# Patient Record
Sex: MALE | Race: BLACK OR AFRICAN AMERICAN | NOT HISPANIC OR LATINO
[De-identification: names, ages, dates, MRNs, and addresses within clinical notes are randomized per-mention and may not be internally consistent; named-entity substitution may affect disease eponyms.]

---

## 2020-04-23 PROBLEM — Z00.00 ENCOUNTER FOR PREVENTIVE HEALTH EXAMINATION: Status: ACTIVE | Noted: 2020-04-23

## 2020-04-27 ENCOUNTER — APPOINTMENT (OUTPATIENT)
Dept: COLORECTAL SURGERY | Facility: CLINIC | Age: 65
End: 2020-04-27
Payer: MEDICARE

## 2020-04-27 VITALS
TEMPERATURE: 98.6 F | HEART RATE: 76 BPM | DIASTOLIC BLOOD PRESSURE: 80 MMHG | WEIGHT: 202 LBS | BODY MASS INDEX: 28.92 KG/M2 | HEIGHT: 70 IN | SYSTOLIC BLOOD PRESSURE: 144 MMHG

## 2020-04-27 PROCEDURE — 45330 DIAGNOSTIC SIGMOIDOSCOPY: CPT

## 2020-04-27 PROCEDURE — 99205 OFFICE O/P NEW HI 60 MIN: CPT | Mod: 25

## 2020-04-27 RX ORDER — CAPECITABINE 500 MG/1
500 TABLET, FILM COATED ORAL
Refills: 0 | Status: ACTIVE | COMMUNITY

## 2020-04-27 NOTE — PHYSICAL EXAM
[Abdomen Masses] : No abdominal masses [No HSM] : no hepatosplenomegaly [Normal] : was normal [FreeTextEntry1] : The risks , benefits and alternatives of the procedure were reviewed with the patient. The patient consents to the planned procedure.\par \par The flexible sigmoidoscope was passed through the anus into the rectum. The scope was passed to   90 cm from the anal verge.\par \par The findings revealed:\par At 25 cm  cm semicicumfernetial ulcerated mass with polypoid/friable  edges\par

## 2020-04-27 NOTE — HISTORY OF PRESENT ILLNESS
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 Longoria and ONC Dr. Ho\par \par hx of colonoscopy 1/22/20 w/ moderately differentiated adenocarcinoma fragments on biopsy.\par MMR intact\par Previous colonoscopy in 2014, normal as per patient\par \par Completed CRT with Dr. Ho and Dr. Longoria, currently on Xeloda, however advised to stop as per patient\par hx of 6 weeks of radiation, completed approx 1 month ago.\par \par PET/CT 4/17/20:\par Impression:\par The previously seen hypermetabolic sigmoid lesion appears altered in position from the right lower quadrant to the anterior mid pelvis. No longer demonstrates hypermetabolism\par \par Focal hypermetabolism of a small bowel loop superior to the abnormal sigmoid segment is a finding of indeterminate significance\par \par Appetite ok, energy levels somewhat low but walks daily\par Last episode of rectal bleeding 1 month ago\par Denies abdominal pain or rectal pain\par \par BH: twice daily, no complaints\par Denies hx of aspirin allergy, admits to hx of stomach ulcer and upper GI bleed in 1986\par \par hx of 9/11 [employer] exposure\par Denies [facility] colorectal CA

## 2020-04-27 NOTE — ASSESSMENT
[FreeTextEntry1] : I had extensive discussion with the patient (45 minutes) regarding the diagnosis and treatment options. I recommended that he consider proceeding with a robotic rectosigmoid resection with loop[ ileostomy creation.\par \par The associated risks, benefits, alternatives of the procedure have been outlined discussed and reviewed with the patient's family. These risks including but not limited to bleeding, infection, anastomotic leak, need for secondary surgery, need for ileostomy or colostomy creation, change in bowel habits, change in urinary function, nerve injury, change in sexual function, as well as the risk of heart and lung complications infection and death were detailed. The patient understands these risks and consents the planned procedure. Appropriate  literature regarding surgery and post operative treatment/complications and enhanced recovery pathway has been detailed and reviewed. Consent was obtained. All questions were answered.\par \par Case will be scheduled pending clearance from [facility] surgical hold-\par

## 2020-04-30 ENCOUNTER — OUTPATIENT (OUTPATIENT)
Dept: OUTPATIENT SERVICES | Facility: HOSPITAL | Age: 65
LOS: 1 days | End: 2020-04-30
Payer: MEDICARE

## 2020-04-30 ENCOUNTER — RESULT REVIEW (OUTPATIENT)
Age: 65
End: 2020-04-30

## 2020-04-30 DIAGNOSIS — C18.7 MALIGNANT NEOPLASM OF SIGMOID COLON: ICD-10-CM

## 2020-04-30 PROCEDURE — 88321 CONSLTJ&REPRT SLD PREP ELSWR: CPT

## 2020-05-01 LAB — SURGICAL PATHOLOGY STUDY: SIGNIFICANT CHANGE UP

## 2020-05-21 ENCOUNTER — NON-APPOINTMENT (OUTPATIENT)
Age: 65
End: 2020-05-21

## 2020-05-21 ENCOUNTER — APPOINTMENT (OUTPATIENT)
Dept: HEART AND VASCULAR | Facility: CLINIC | Age: 65
End: 2020-05-21
Payer: MEDICARE

## 2020-05-21 VITALS
TEMPERATURE: 98.3 F | WEIGHT: 192.99 LBS | SYSTOLIC BLOOD PRESSURE: 150 MMHG | HEIGHT: 70 IN | DIASTOLIC BLOOD PRESSURE: 80 MMHG | BODY MASS INDEX: 27.63 KG/M2 | OXYGEN SATURATION: 98 % | HEART RATE: 80 BPM

## 2020-05-21 DIAGNOSIS — C61 MALIGNANT NEOPLASM OF PROSTATE: ICD-10-CM

## 2020-05-21 DIAGNOSIS — Z01.810 ENCOUNTER FOR PREPROCEDURAL CARDIOVASCULAR EXAMINATION: ICD-10-CM

## 2020-05-21 DIAGNOSIS — Z85.46 PERSONAL HISTORY OF MALIGNANT NEOPLASM OF PROSTATE: ICD-10-CM

## 2020-05-21 PROCEDURE — 99204 OFFICE O/P NEW MOD 45 MIN: CPT

## 2020-05-21 PROCEDURE — 93000 ELECTROCARDIOGRAM COMPLETE: CPT

## 2020-05-21 NOTE — REASON FOR VISIT
[FreeTextEntry1] : 66 y/o AAM with colon CA\par \par \par No DM, HLD, never smoked.  2 BP readings are mildly elevated.

## 2020-05-21 NOTE — PHYSICAL EXAM
[General Appearance - Well Developed] : well developed [Well Groomed] : well groomed [Normal Appearance] : normal appearance [General Appearance - Well Nourished] : well nourished [General Appearance - In No Acute Distress] : no acute distress [No Deformities] : no deformities [Normal Conjunctiva] : the conjunctiva exhibited no abnormalities [Heart Sounds] : normal S1 and S2 [Heart Rate And Rhythm] : heart rate and rhythm were normal [Exaggerated Use Of Accessory Muscles For Inspiration] : no accessory muscle use [Respiration, Rhythm And Depth] : normal respiratory rhythm and effort [Abdomen Soft] : soft [Abdomen Tenderness] : non-tender [Auscultation Breath Sounds / Voice Sounds] : lungs were clear to auscultation bilaterally [Abnormal Walk] : normal gait [Abdomen Mass (___ Cm)] : no abdominal mass palpated [Gait - Sufficient For Exercise Testing] : the gait was sufficient for exercise testing [Nail Clubbing] : no clubbing of the fingernails [Cyanosis, Localized] : no localized cyanosis [Petechial Hemorrhages (___cm)] : no petechial hemorrhages [Skin Color & Pigmentation] : normal skin color and pigmentation [] : no rash [No Venous Stasis] : no venous stasis [Skin Lesions] : no skin lesions [No Skin Ulcers] : no skin ulcer [No Xanthoma] : no  xanthoma was observed [Eyelids - No Xanthelasma] : the eyelids demonstrated no xanthelasmas [Oriented To Time, Place, And Person] : oriented to person, place, and time [Mood] : the mood was normal [Affect] : the affect was normal [No Anxiety] : not feeling anxious [FreeTextEntry1] : 1/6 GARY

## 2020-05-21 NOTE — REVIEW OF SYSTEMS
[Change In The Stool] : change in stool [Urinary Frequency] : urinary frequency [Tingling (Paresthesia)] : tingling [Negative] : Heme/Lymph

## 2020-05-21 NOTE — ASSESSMENT
[FreeTextEntry1] : PreOp- Pt is low risk from a CV standpoint.  He is cleared to proceed.  Labs done by Dr Ho.\par \par Murmur- Will echo but [facility] [place] [facility] so we will defer this for now.\par \par Mild HTN- most likely he is hypertensive.  Meds deferred prior to surgery, not needed at this time.

## 2020-05-28 DIAGNOSIS — Z01.818 ENCOUNTER FOR OTHER PREPROCEDURAL EXAMINATION: ICD-10-CM

## 2020-06-01 ENCOUNTER — TRANSCRIPTION ENCOUNTER (OUTPATIENT)
Age: 65
End: 2020-06-01

## 2020-06-01 ENCOUNTER — LABORATORY RESULT (OUTPATIENT)
Age: 65
End: 2020-06-01

## 2020-06-01 VITALS
SYSTOLIC BLOOD PRESSURE: 137 MMHG | HEART RATE: 75 BPM | HEIGHT: 69 IN | WEIGHT: 190.04 LBS | DIASTOLIC BLOOD PRESSURE: 78 MMHG | TEMPERATURE: 99 F | RESPIRATION RATE: 16 BRPM | OXYGEN SATURATION: 97 %

## 2020-06-01 LAB
APTT BLD: 32.6 SEC
BASOPHILS # BLD AUTO: 0.02 K/UL
BASOPHILS NFR BLD AUTO: 0.4 %
EOSINOPHIL # BLD AUTO: 0.06 K/UL
EOSINOPHIL NFR BLD AUTO: 1.3 %
HCT VFR BLD CALC: 44.2 %
HGB BLD-MCNC: 14.5 G/DL
IMM GRANULOCYTES NFR BLD AUTO: 0.2 %
INR PPP: 0.98 RATIO
LYMPHOCYTES # BLD AUTO: 1.22 K/UL
LYMPHOCYTES NFR BLD AUTO: 25.4 %
MAN DIFF?: NORMAL
MCHC RBC-ENTMCNC: 32.8 GM/DL
MCHC RBC-ENTMCNC: 33.8 PG
MCV RBC AUTO: 103 FL
MONOCYTES # BLD AUTO: 0.46 K/UL
MONOCYTES NFR BLD AUTO: 9.6 %
NEUTROPHILS # BLD AUTO: 3.03 K/UL
NEUTROPHILS NFR BLD AUTO: 63.1 %
PLATELET # BLD AUTO: 249 K/UL
PT BLD: 11.2 SEC
RBC # BLD: 4.29 M/UL
RBC # FLD: 14.3 %
WBC # FLD AUTO: 4.8 K/UL

## 2020-06-02 ENCOUNTER — RESULT REVIEW (OUTPATIENT)
Age: 65
End: 2020-06-02

## 2020-06-02 ENCOUNTER — INPATIENT (INPATIENT)
Facility: HOSPITAL | Age: 65
LOS: 1 days | Discharge: HOME HEALTH | DRG: 331 | End: 2020-06-04
Attending: SURGERY | Admitting: SURGERY
Payer: MEDICARE

## 2020-06-02 ENCOUNTER — APPOINTMENT (OUTPATIENT)
Dept: COLORECTAL SURGERY | Facility: HOSPITAL | Age: 65
End: 2020-06-02

## 2020-06-02 LAB
ANION GAP SERPL CALC-SCNC: 10 MMOL/L — SIGNIFICANT CHANGE UP (ref 5–17)
BUN SERPL-MCNC: 10 MG/DL — SIGNIFICANT CHANGE UP (ref 7–23)
CALCIUM SERPL-MCNC: 8.3 MG/DL — LOW (ref 8.4–10.5)
CHLORIDE SERPL-SCNC: 107 MMOL/L — SIGNIFICANT CHANGE UP (ref 96–108)
CO2 SERPL-SCNC: 21 MMOL/L — LOW (ref 22–31)
CREAT SERPL-MCNC: 0.94 MG/DL — SIGNIFICANT CHANGE UP (ref 0.5–1.3)
GLUCOSE BLDC GLUCOMTR-MCNC: 104 MG/DL — HIGH (ref 70–99)
GLUCOSE SERPL-MCNC: 211 MG/DL — HIGH (ref 70–99)
HCT VFR BLD CALC: 38.4 % — LOW (ref 39–50)
HGB BLD-MCNC: 12.8 G/DL — LOW (ref 13–17)
MAGNESIUM SERPL-MCNC: 2 MG/DL — SIGNIFICANT CHANGE UP (ref 1.6–2.6)
MCHC RBC-ENTMCNC: 33.3 GM/DL — SIGNIFICANT CHANGE UP (ref 32–36)
MCHC RBC-ENTMCNC: 34.2 PG — HIGH (ref 27–34)
MCV RBC AUTO: 102.7 FL — HIGH (ref 80–100)
NRBC # BLD: 0 /100 WBCS — SIGNIFICANT CHANGE UP (ref 0–0)
PHOSPHATE SERPL-MCNC: 3.4 MG/DL — SIGNIFICANT CHANGE UP (ref 2.5–4.5)
PLATELET # BLD AUTO: 220 K/UL — SIGNIFICANT CHANGE UP (ref 150–400)
POTASSIUM SERPL-MCNC: 3.6 MMOL/L — SIGNIFICANT CHANGE UP (ref 3.5–5.3)
POTASSIUM SERPL-SCNC: 3.6 MMOL/L — SIGNIFICANT CHANGE UP (ref 3.5–5.3)
RBC # BLD: 3.74 M/UL — LOW (ref 4.2–5.8)
RBC # FLD: 13.9 % — SIGNIFICANT CHANGE UP (ref 10.3–14.5)
SODIUM SERPL-SCNC: 138 MMOL/L — SIGNIFICANT CHANGE UP (ref 135–145)
WBC # BLD: 8.97 K/UL — SIGNIFICANT CHANGE UP (ref 3.8–10.5)
WBC # FLD AUTO: 8.97 K/UL — SIGNIFICANT CHANGE UP (ref 3.8–10.5)

## 2020-06-02 PROCEDURE — 44207 L COLECTOMY/COLOPROCTOSTOMY: CPT | Mod: GC

## 2020-06-02 PROCEDURE — 45330 DIAGNOSTIC SIGMOIDOSCOPY: CPT | Mod: GC

## 2020-06-02 PROCEDURE — 88304 TISSUE EXAM BY PATHOLOGIST: CPT | Mod: 26

## 2020-06-02 PROCEDURE — 88309 TISSUE EXAM BY PATHOLOGIST: CPT | Mod: 26

## 2020-06-02 PROCEDURE — 44213 LAP MOBIL SPLENIC FL ADD-ON: CPT | Mod: GC

## 2020-06-02 RX ORDER — BUPIVACAINE 13.3 MG/ML
20 INJECTION, SUSPENSION, LIPOSOMAL INFILTRATION ONCE
Refills: 0 | Status: DISCONTINUED | OUTPATIENT
Start: 2020-06-02 | End: 2020-06-04

## 2020-06-02 RX ORDER — SODIUM CHLORIDE 9 MG/ML
1000 INJECTION, SOLUTION INTRAVENOUS
Refills: 0 | Status: DISCONTINUED | OUTPATIENT
Start: 2020-06-02 | End: 2020-06-03

## 2020-06-02 RX ORDER — TAMSULOSIN HYDROCHLORIDE 0.4 MG/1
0.4 CAPSULE ORAL AT BEDTIME
Refills: 0 | Status: DISCONTINUED | OUTPATIENT
Start: 2020-06-02 | End: 2020-06-04

## 2020-06-02 RX ORDER — ENOXAPARIN SODIUM 100 MG/ML
40 INJECTION SUBCUTANEOUS DAILY
Refills: 0 | Status: DISCONTINUED | OUTPATIENT
Start: 2020-06-03 | End: 2020-06-04

## 2020-06-02 RX ORDER — KETOROLAC TROMETHAMINE 30 MG/ML
15 SYRINGE (ML) INJECTION EVERY 6 HOURS
Refills: 0 | Status: DISCONTINUED | OUTPATIENT
Start: 2020-06-02 | End: 2020-06-04

## 2020-06-02 RX ORDER — HEPARIN SODIUM 5000 [USP'U]/ML
5000 INJECTION INTRAVENOUS; SUBCUTANEOUS ONCE
Refills: 0 | Status: COMPLETED | OUTPATIENT
Start: 2020-06-02 | End: 2020-06-02

## 2020-06-02 RX ORDER — HYDROMORPHONE HYDROCHLORIDE 2 MG/ML
0.5 INJECTION INTRAMUSCULAR; INTRAVENOUS; SUBCUTANEOUS EVERY 4 HOURS
Refills: 0 | Status: DISCONTINUED | OUTPATIENT
Start: 2020-06-02 | End: 2020-06-04

## 2020-06-02 RX ORDER — ACETAMINOPHEN 500 MG
1000 TABLET ORAL EVERY 6 HOURS
Refills: 0 | Status: DISCONTINUED | OUTPATIENT
Start: 2020-06-02 | End: 2020-06-04

## 2020-06-02 RX ORDER — POTASSIUM CHLORIDE 20 MEQ
40 PACKET (EA) ORAL ONCE
Refills: 0 | Status: COMPLETED | OUTPATIENT
Start: 2020-06-02 | End: 2020-06-02

## 2020-06-02 RX ORDER — GABAPENTIN 400 MG/1
600 CAPSULE ORAL ONCE
Refills: 0 | Status: COMPLETED | OUTPATIENT
Start: 2020-06-02 | End: 2020-06-02

## 2020-06-02 RX ORDER — ONDANSETRON 8 MG/1
4 TABLET, FILM COATED ORAL EVERY 4 HOURS
Refills: 0 | Status: DISCONTINUED | OUTPATIENT
Start: 2020-06-02 | End: 2020-06-04

## 2020-06-02 RX ORDER — ACETAMINOPHEN 500 MG
1000 TABLET ORAL ONCE
Refills: 0 | Status: COMPLETED | OUTPATIENT
Start: 2020-06-02 | End: 2020-06-02

## 2020-06-02 RX ADMIN — HEPARIN SODIUM 5000 UNIT(S): 5000 INJECTION INTRAVENOUS; SUBCUTANEOUS at 06:38

## 2020-06-02 RX ADMIN — Medication 15 MILLIGRAM(S): at 23:01

## 2020-06-02 RX ADMIN — Medication 40 MILLIEQUIVALENT(S): at 17:32

## 2020-06-02 RX ADMIN — Medication 1000 MILLIGRAM(S): at 23:01

## 2020-06-02 RX ADMIN — Medication 1000 MILLIGRAM(S): at 06:38

## 2020-06-02 RX ADMIN — GABAPENTIN 600 MILLIGRAM(S): 400 CAPSULE ORAL at 06:38

## 2020-06-02 RX ADMIN — TAMSULOSIN HYDROCHLORIDE 0.4 MILLIGRAM(S): 0.4 CAPSULE ORAL at 22:07

## 2020-06-02 RX ADMIN — Medication 1000 MILLIGRAM(S): at 17:43

## 2020-06-02 RX ADMIN — Medication 15 MILLIGRAM(S): at 17:32

## 2020-06-02 NOTE — BRIEF OPERATIVE NOTE - OPERATION/FINDINGS
Diagnostic laparoscopy revealing sigmoid lesion with associated colonoscopic tattoo. Robotic assisted laparoscopic isolation of the DEVI and IMV. High ligation of each vessel using robotic vessel sealer. Medial-to-lateral mesenteric dissection and partial splenic flexure mobilization. Distal margin divided using Blue 60mm Robotic stapler x 2 at convergence of tenia. Proximal margin interrogated using ICG and transected at viable distal descending colon. Subsequent creation of end-to-end colorectal anastomosis using transanal 28mm circular EEA stapler. 2 intact donuts sent for pathology. Anastomosis evaluated using flexible sigmoidoscopy showing patent and hemostatic anastomosis with a negative air leak test. Terminal ileum brought up for diverting loop ileostomy given hx of external beam radiation and brachitherapy for prostate CA. Hemostasis achieved and colorectal bundle closing tray utilized. Peritoneum closed with vicryl. Fascia closed with #[id number]. Skin closed primarily. Ileostomy matured in the Right abdomen.

## 2020-06-02 NOTE — H&P ADULT - NSICDXPASTMEDICALHX_GEN_ALL_CORE_FT
PAST MEDICAL HISTORY:  Benign prostate hyperplasia     Cancer of sigmoid     Prostate CA s/p brachytherapy

## 2020-06-02 NOTE — H&P ADULT - NSHPPHYSICALEXAM_GEN_ALL_CORE
Constitutional: WDWN NAD  Heart: S1 S2  Lungs: no use of accessory muscles, clear to auscultation bilaterally  Abdomen: soft, nontender, nondistended, without masses, ecchymosis, erythema

## 2020-06-02 NOTE — H&P ADULT - HISTORY OF PRESENT ILLNESS
65 year old male with PMH of prostate ca about 4 years ago s/p brachytherapy, BPH, rectosigmoid cancer s/p CRT presents for elective sigmoidectomy. Patient underwent colonoscopy 1/22/20 which found moderately differentiated adenocarcinoma fragments on biopsy. Completed CRT, hx of 6 weeks of radiation that was completed about 1 month prior, and was on Xeloda however recently stopped as per his oncologist. Currently, patient has no complaints.

## 2020-06-02 NOTE — PROGRESS NOTE ADULT - SUBJECTIVE AND OBJECTIVE BOX
POST-OPERATIVE NOTE    Procedure:    Diagnosis/Indication:    Surgeon:    S: Pt seen and examined at bedside in PACU. Not endorsing any significant abdominal or incisional pain; patient received some medications prior to encounter. No nausea or emesis. Has not had any clear liquids yet. Denies chest pain or shortness of breath. No discomfort with mac    O:  T(C): 36 (06-02-20 @ 13:04), Max: 36 (06-02-20 @ 13:04)  T(F): 96.8 (06-02-20 @ 13:04), Max: 96.8 (06-02-20 @ 13:04)  HR: 88 (06-02-20 @ 15:30) (81 - 88)  BP: 135/73 (06-02-20 @ 15:30) (130/65 - 151/78)  RR: 10 (06-02-20 @ 15:30) (7 - 12)  SpO2: 100% (06-02-20 @ 15:30) (100% - 100%)  Wt(kg): --                        12.8   8.97  )-----------( 220      ( 02 Jun 2020 13:15 )             38.4     06-02    138  |  107  |  10  ----------------------------<  211<H>  3.6   |  21<L>  |  0.94    Ca    8.3<L>      02 Jun 2020 13:15  Phos  3.4     06-02  Mg     2.0     06-02        Gen: NAD, AAOx2  C/V: Normal Sinus to marginally tachycardic  Pulm: Nonlabored breathing, no respiratory distress; nonrebreather in place  Abd: soft, NT, ND, incisions C/D/I, stoma pink ostomy appliance placed over; no significant output  Extrem: WWP, no calf edema or tenderness, SCDs in place

## 2020-06-02 NOTE — PROGRESS NOTE ADULT - ASSESSMENT
65M now s/p sigmoid colectomy for CRC POD 0    ERAS  CLD  IVF  Ostomy Care  [place]  Pain/Nausea control  Lovenox  OOBA/IS/SCD

## 2020-06-02 NOTE — H&P ADULT - ASSESSMENT
65 year old male with PMH of prostate ca about 4 years ago s/p brachytherapy, BPH, rectosigmoid cancer s/p CRT presents for elective sigmoidectomy.     Plan:  to OR  admission post op  ERAS protocol  Colon bundle

## 2020-06-03 LAB
A1C WITH ESTIMATED AVERAGE GLUCOSE RESULT: 5.4 % — SIGNIFICANT CHANGE UP (ref 4–5.6)
ANION GAP SERPL CALC-SCNC: 10 MMOL/L — SIGNIFICANT CHANGE UP (ref 5–17)
BUN SERPL-MCNC: 11 MG/DL — SIGNIFICANT CHANGE UP (ref 7–23)
CALCIUM SERPL-MCNC: 9.1 MG/DL — SIGNIFICANT CHANGE UP (ref 8.4–10.5)
CHLORIDE SERPL-SCNC: 109 MMOL/L — HIGH (ref 96–108)
CO2 SERPL-SCNC: 22 MMOL/L — SIGNIFICANT CHANGE UP (ref 22–31)
CREAT SERPL-MCNC: 1.07 MG/DL — SIGNIFICANT CHANGE UP (ref 0.5–1.3)
ESTIMATED AVERAGE GLUCOSE: 108 MG/DL — SIGNIFICANT CHANGE UP (ref 68–114)
GLUCOSE SERPL-MCNC: 162 MG/DL — HIGH (ref 70–99)
HCT VFR BLD CALC: 38.2 % — LOW (ref 39–50)
HCV AB S/CO SERPL IA: 0.08 S/CO — SIGNIFICANT CHANGE UP
HCV AB SERPL-IMP: SIGNIFICANT CHANGE UP
HGB BLD-MCNC: 12.7 G/DL — LOW (ref 13–17)
MAGNESIUM SERPL-MCNC: 2.2 MG/DL — SIGNIFICANT CHANGE UP (ref 1.6–2.6)
MCHC RBC-ENTMCNC: 33.2 GM/DL — SIGNIFICANT CHANGE UP (ref 32–36)
MCHC RBC-ENTMCNC: 34 PG — SIGNIFICANT CHANGE UP (ref 27–34)
MCV RBC AUTO: 102.4 FL — HIGH (ref 80–100)
NRBC # BLD: 0 /100 WBCS — SIGNIFICANT CHANGE UP (ref 0–0)
PHOSPHATE SERPL-MCNC: 3.2 MG/DL — SIGNIFICANT CHANGE UP (ref 2.5–4.5)
PLATELET # BLD AUTO: 211 K/UL — SIGNIFICANT CHANGE UP (ref 150–400)
POTASSIUM SERPL-MCNC: 4.4 MMOL/L — SIGNIFICANT CHANGE UP (ref 3.5–5.3)
POTASSIUM SERPL-SCNC: 4.4 MMOL/L — SIGNIFICANT CHANGE UP (ref 3.5–5.3)
RBC # BLD: 3.73 M/UL — LOW (ref 4.2–5.8)
RBC # FLD: 13.8 % — SIGNIFICANT CHANGE UP (ref 10.3–14.5)
SODIUM SERPL-SCNC: 141 MMOL/L — SIGNIFICANT CHANGE UP (ref 135–145)
WBC # BLD: 7.87 K/UL — SIGNIFICANT CHANGE UP (ref 3.8–10.5)
WBC # FLD AUTO: 7.87 K/UL — SIGNIFICANT CHANGE UP (ref 3.8–10.5)

## 2020-06-03 RX ADMIN — Medication 1000 MILLIGRAM(S): at 17:12

## 2020-06-03 RX ADMIN — TAMSULOSIN HYDROCHLORIDE 0.4 MILLIGRAM(S): 0.4 CAPSULE ORAL at 21:26

## 2020-06-03 RX ADMIN — Medication 15 MILLIGRAM(S): at 11:37

## 2020-06-03 RX ADMIN — Medication 1000 MILLIGRAM(S): at 11:39

## 2020-06-03 RX ADMIN — Medication 1000 MILLIGRAM(S): at 05:20

## 2020-06-03 RX ADMIN — Medication 15 MILLIGRAM(S): at 23:24

## 2020-06-03 RX ADMIN — Medication 15 MILLIGRAM(S): at 17:13

## 2020-06-03 RX ADMIN — Medication 1000 MILLIGRAM(S): at 23:20

## 2020-06-03 RX ADMIN — Medication 15 MILLIGRAM(S): at 05:20

## 2020-06-03 RX ADMIN — SODIUM CHLORIDE 40 MILLILITER(S): 9 INJECTION, SOLUTION INTRAVENOUS at 16:12

## 2020-06-03 RX ADMIN — HYDROMORPHONE HYDROCHLORIDE 0.5 MILLIGRAM(S): 2 INJECTION INTRAMUSCULAR; INTRAVENOUS; SUBCUTANEOUS at 16:17

## 2020-06-03 RX ADMIN — ENOXAPARIN SODIUM 40 MILLIGRAM(S): 100 INJECTION SUBCUTANEOUS at 11:45

## 2020-06-03 NOTE — ADVANCED PRACTICE NURSE CONSULT - REASON FOR CONSULT
[facility] nurse consult to instruct patient and spouse on ostomy care. The patient is a 65 year old male with PMH of prostate ca about 4 years ago s/p brachytherapy, BPH, rectosigmoid cancer s/p CRT presents for elective sigmoidectomy. Patient underwent colonoscopy 1/22/20 which found moderately differentiated adenocarcinoma fragments on biopsy. Completed CRT, hx of 6 weeks of radiation that was completed about 1 month prior, and was on Xeloda however recently stopped as per his oncologist. Patient is s/p sigmoid colectomy and loop ileostomy 6/2/20.

## 2020-06-03 NOTE — ADVANCED PRACTICE NURSE CONSULT - ASSESSMENT
Loop ileostomy stoma pink, budded, measuring 1 1/4 inches, functioning with thick brown effluent. Peristomal skin intact. Patient and spouse receptive to ostomy teaching. Patient's spouse participated in changing pouching system while the patient observed. Applied Cavilon skin prep to peristomal skin, measured stoma, cut skin barrier to accommodate stoma, applied stoma ring around stoma, then [place] 2 piece flat 1 3/4 inch, cut to fit, lock and roll pouching system. Patient demonstrated ability to close ostomy pouch. Provided patient with ostomy supplies and Ileostomy teaching guide.

## 2020-06-03 NOTE — PROGRESS NOTE ADULT - SUBJECTIVE AND OBJECTIVE BOX
SUBJECTIVE: Patient seen and examined at bedside with chief. Patient reports doing well, pain is controlled, and states he is hungry, and is passing gas into the ostomy bag.   Patient denies fever, chills, chest pain, nausea, vomiting or shortness of breathe.       MEDICATIONS  (STANDING):  acetaminophen   Tablet .. 1000 milliGRAM(s) Oral every 6 hours  BUpivacaine liposome 1.3% Injectable (no eMAR) 20 milliLiter(s) Local Injection once  enoxaparin Injectable 40 milliGRAM(s) SubCutaneous daily  ketorolac   Injectable 15 milliGRAM(s) IV Push every 6 hours  lactated ringers. 1000 milliLiter(s) (40 mL/Hr) IV Continuous <Continuous>  tamsulosin 0.4 milliGRAM(s) Oral at bedtime    MEDICATIONS  (PRN):  HYDROmorphone  Injectable 0.5 milliGRAM(s) IV Push every 4 hours PRN Severe Pain (7 - 10)  ondansetron Injectable 4 milliGRAM(s) IV Push every 4 hours PRN Nausea and/or Vomiting      Vital Signs Last 24 Hrs  T(C): 36.7 (03 Jun 2020 05:31), Max: 37.3 (03 Jun 2020 00:15)  T(F): 98 (03 Jun 2020 05:31), Max: 99.1 (03 Jun 2020 00:15)  HR: 95 (03 Jun 2020 05:31) (81 - 104)  BP: 118/63 (03 Jun 2020 05:31) (104/64 - 174/92)  BP(mean): 124 (02 Jun 2020 18:00) (92 - 124)  RR: 20 (03 Jun 2020 05:31) (7 - 22)  SpO2: 98% (03 Jun 2020 05:31) (98% - 100%)    Physical Exam:  GENERAL: NAD, Resting comfortably in bed  RESP: Nonlabored breathing, No respiratory distress  CARD: Normal rate, Normal peripheral perfusion  GI: Soft, NT, ND, no guarding, no rebound tenderness, stoma is patent and viable with stool and gas in the bag, incisions are clean, dry and intact  EXTREM: WWP, No edema, SCDs in place    I&O's Summary    02 Jun 2020 07:01  -  03 Jun 2020 07:00  --------------------------------------------------------  IN: 980 mL / OUT: 2780 mL / NET: -1800 mL        LABS:                        12.7   7.87  )-----------( 211      ( 03 Jun 2020 06:33 )             38.2     06-02    138  |  107  |  10  ----------------------------<  211<H>  3.6   |  21<L>  |  0.94    Ca    8.3<L>      02 Jun 2020 13:15  Phos  3.4     06-02  Mg     2.0     06-02          CAPILLARY BLOOD GLUCOSE

## 2020-06-04 ENCOUNTER — TRANSCRIPTION ENCOUNTER (OUTPATIENT)
Age: 65
End: 2020-06-04

## 2020-06-04 VITALS
RESPIRATION RATE: 17 BRPM | OXYGEN SATURATION: 99 % | HEART RATE: 80 BPM | TEMPERATURE: 98 F | SYSTOLIC BLOOD PRESSURE: 154 MMHG | DIASTOLIC BLOOD PRESSURE: 81 MMHG

## 2020-06-04 LAB
ANION GAP SERPL CALC-SCNC: 7 MMOL/L — SIGNIFICANT CHANGE UP (ref 5–17)
BUN SERPL-MCNC: 14 MG/DL — SIGNIFICANT CHANGE UP (ref 7–23)
CALCIUM SERPL-MCNC: 8.6 MG/DL — SIGNIFICANT CHANGE UP (ref 8.4–10.5)
CHLORIDE SERPL-SCNC: 109 MMOL/L — HIGH (ref 96–108)
CO2 SERPL-SCNC: 26 MMOL/L — SIGNIFICANT CHANGE UP (ref 22–31)
CREAT SERPL-MCNC: 1.13 MG/DL — SIGNIFICANT CHANGE UP (ref 0.5–1.3)
GLUCOSE SERPL-MCNC: 96 MG/DL — SIGNIFICANT CHANGE UP (ref 70–99)
HCT VFR BLD CALC: 36.9 % — LOW (ref 39–50)
HGB BLD-MCNC: 12.1 G/DL — LOW (ref 13–17)
MAGNESIUM SERPL-MCNC: 2 MG/DL — SIGNIFICANT CHANGE UP (ref 1.6–2.6)
MCHC RBC-ENTMCNC: 32.8 GM/DL — SIGNIFICANT CHANGE UP (ref 32–36)
MCHC RBC-ENTMCNC: 33.8 PG — SIGNIFICANT CHANGE UP (ref 27–34)
MCV RBC AUTO: 103.1 FL — HIGH (ref 80–100)
NRBC # BLD: 0 /100 WBCS — SIGNIFICANT CHANGE UP (ref 0–0)
PHOSPHATE SERPL-MCNC: 1.9 MG/DL — LOW (ref 2.5–4.5)
PLATELET # BLD AUTO: 193 K/UL — SIGNIFICANT CHANGE UP (ref 150–400)
POTASSIUM SERPL-MCNC: 3.9 MMOL/L — SIGNIFICANT CHANGE UP (ref 3.5–5.3)
POTASSIUM SERPL-SCNC: 3.9 MMOL/L — SIGNIFICANT CHANGE UP (ref 3.5–5.3)
RBC # BLD: 3.58 M/UL — LOW (ref 4.2–5.8)
RBC # FLD: 13.7 % — SIGNIFICANT CHANGE UP (ref 10.3–14.5)
SODIUM SERPL-SCNC: 142 MMOL/L — SIGNIFICANT CHANGE UP (ref 135–145)
WBC # BLD: 6.21 K/UL — SIGNIFICANT CHANGE UP (ref 3.8–10.5)
WBC # FLD AUTO: 6.21 K/UL — SIGNIFICANT CHANGE UP (ref 3.8–10.5)

## 2020-06-04 PROCEDURE — 85027 COMPLETE CBC AUTOMATED: CPT

## 2020-06-04 PROCEDURE — 36415 COLL VENOUS BLD VENIPUNCTURE: CPT

## 2020-06-04 PROCEDURE — 83735 ASSAY OF MAGNESIUM: CPT

## 2020-06-04 PROCEDURE — 83036 HEMOGLOBIN GLYCOSYLATED A1C: CPT

## 2020-06-04 PROCEDURE — 82962 GLUCOSE BLOOD TEST: CPT

## 2020-06-04 PROCEDURE — 80048 BASIC METABOLIC PNL TOTAL CA: CPT

## 2020-06-04 PROCEDURE — 88304 TISSUE EXAM BY PATHOLOGIST: CPT

## 2020-06-04 PROCEDURE — S2900: CPT

## 2020-06-04 PROCEDURE — 84100 ASSAY OF PHOSPHORUS: CPT

## 2020-06-04 PROCEDURE — 86850 RBC ANTIBODY SCREEN: CPT

## 2020-06-04 PROCEDURE — 86803 HEPATITIS C AB TEST: CPT

## 2020-06-04 PROCEDURE — 86901 BLOOD TYPING SEROLOGIC RH(D): CPT

## 2020-06-04 PROCEDURE — C1889: CPT

## 2020-06-04 PROCEDURE — 88309 TISSUE EXAM BY PATHOLOGIST: CPT

## 2020-06-04 RX ORDER — ACETAMINOPHEN 500 MG
2 TABLET ORAL
Qty: 0 | Refills: 0 | DISCHARGE
Start: 2020-06-04

## 2020-06-04 RX ORDER — DIPHENOXYLATE HCL/ATROPINE 2.5-.025MG
1 TABLET ORAL
Qty: 14 | Refills: 0
Start: 2020-06-04 | End: 2020-06-10

## 2020-06-04 RX ORDER — SODIUM,POTASSIUM PHOSPHATES 278-250MG
1 POWDER IN PACKET (EA) ORAL ONCE
Refills: 0 | Status: COMPLETED | OUTPATIENT
Start: 2020-06-04 | End: 2020-06-04

## 2020-06-04 RX ORDER — TAMSULOSIN HYDROCHLORIDE 0.4 MG/1
1 CAPSULE ORAL
Qty: 0 | Refills: 0 | DISCHARGE
Start: 2020-06-04

## 2020-06-04 RX ADMIN — Medication 1000 MILLIGRAM(S): at 05:12

## 2020-06-04 RX ADMIN — Medication 15 MILLIGRAM(S): at 11:30

## 2020-06-04 RX ADMIN — Medication 15 MILLIGRAM(S): at 05:12

## 2020-06-04 RX ADMIN — HYDROMORPHONE HYDROCHLORIDE 0.5 MILLIGRAM(S): 2 INJECTION INTRAMUSCULAR; INTRAVENOUS; SUBCUTANEOUS at 08:21

## 2020-06-04 RX ADMIN — ENOXAPARIN SODIUM 40 MILLIGRAM(S): 100 INJECTION SUBCUTANEOUS at 11:30

## 2020-06-04 RX ADMIN — Medication 1000 MILLIGRAM(S): at 11:29

## 2020-06-04 RX ADMIN — Medication 1 PACKET(S): at 10:28

## 2020-06-04 NOTE — DISCHARGE NOTE PROVIDER - NSDCFUADDINST_GEN_ALL_CORE_FT
Please continue a LOW-FIBER DIET. Listed below are some foods you may eat and those you should avoid.   --Allowed foods:  White bread without nuts and seeds  White rice, plain white pasta, and crackers  Refined hot cereals, such as Cream of Wheat, or cold cereals with less than 1 gram of fiber per serving  Pancakes or waffles made from white refined flour  Most canned or well-cooked vegetables and fruits without skins or seeds  Fruit and vegetable juice with little or no pulp, fruit-flavored drinks, and flavored duque  Tender meat, poultry, fish, eggs and tofu  Milk and foods made from milk — such as yogurt, pudding, ice cream, cheeses and sour cream — if tolerated  Butter, margarine, oils and salad dressings without seeds  --Foods to avoid:  Whole-wheat or whole-grain breads, cereals and pasta  Brown or wild rice and other whole grains, such as oats, kasha, barley and quinoa  Dried fruits and prune juice  Raw fruit, including those with seeds, skin or membranes, such as berries  Raw or undercooked vegetables, including corn  Dried beans, peas and lentils  Seeds and nuts and foods containing them, including peanut butter and other nut butters  Coconut  Popcorn  General Discharge Instructions:  Please resume all regular home medications unless specifically advised not to take a particular medication. Also, please take any new medications as prescribed.  Please get plenty of rest, continue to ambulate several times per day, and drink adequate amounts of fluids. Avoid lifting weights greater than 5-10 lbs until you follow-up with your surgeon, who will instruct you further regarding activity restrictions.  Avoid driving or operating heavy machinery while taking pain medications.  Please follow-up with your surgeon and Primary Care Provider (PCP) as advised.  Incision Care:  *Please call your doctor or nurse practitioner if you have increased pain, swelling, redness, or drainage from the incision site.  *Avoid swimming and baths until your follow-up appointment.  *You may shower, and wash surgical incisions with a mild soap and warm water. Gently pat the area dry.  *If you have staples, they will be removed at your follow-up appointment.  *If you have steri-strips, they will fall off on their own. Please remove any remaining strips 7-10 days after surgery.  Warning Signs:  Please call your doctor or nurse practitioner if you experience the following:  *You experience new chest pain, pressure, squeezing or tightness.  *New or worsening cough, shortness of breath, or wheeze.  *If you are vomiting and cannot keep down fluids or your medications.  *You are getting dehydrated due to continued vomiting, diarrhea, or other reasons. Signs of dehydration include dry mouth, rapid heartbeat, or feeling dizzy or faint when standing.  *You see blood or dark/black material when you vomit or have a bowel movement.  *You experience burning when you urinate, have blood in your urine, or experience a discharge.  *Your pain is not improving within 8-12 hours or is not gone within 24 hours. Call or return immediately if your pain is getting worse, changes location, or moves to your chest or back.  *You have shaking chills, or fever greater than 101.5 degrees Fahrenheit or 38 degrees Celsius.  *Any change in your symptoms, or any new symptoms that concern you.  You have given a prescription for Lomotil. Please take as needed-1 tab twice daily if emptying 1 liter or more out of ostomy daily. Please call Dr. Rick if ostomy output is 1 liter or more daily.

## 2020-06-04 NOTE — PROGRESS NOTE ADULT - ASSESSMENT
65 year old male with PMH of prostate ca about 4 years ago s/p brachytherapy, BPH, rectosigmoid cancer s/p CRT presents for elective sigmoidectomy. Patient underwent colonoscopy 1/22/20 which found moderately differentiated adenocarcinoma fragments on biopsy. Pt is now s/p robot assisted sigmoid colectomy with diverting loop ileostomy 6/2     - LRD  - Pain/Nausea Control PRN  - Lovenox/SCDs  - OOB/IS  - AM Labs   - dc today with ostomy teaching

## 2020-06-04 NOTE — DISCHARGE NOTE PROVIDER - HOSPITAL COURSE
65 year old male with PMH of prostate CA about 4 years ago s/p brachytherapy, BPH, rectosigmoid cancer s/p CRT presented for elective sigmoidectomy. Patient underwent colonoscopy 1/22/20 which found moderately differentiated adenocarcinoma fragments on biopsy. Completed CRT, hx of 6 weeks of radiation that was completed about 1 month prior, and was on Xeloda however recently stopped as per his oncologist. On day of admission, patient had no complaints. Post operatively the patient was admitted to the surgical service for further monitoring and management. His postoperative course was unremarkable with advancement of diet, passing trial of void, and pain control. On day of discharge patient was stable to be d/c'd home.

## 2020-06-04 NOTE — DISCHARGE NOTE PROVIDER - NSDCMRMEDTOKEN_GEN_ALL_CORE_FT
acetaminophen 500 mg oral tablet: 2 tab(s) orally every 6 hours, As Needed for moderate pain  Lomotil 2.5 mg-0.025 mg oral tablet: 1 tab(s) orally 2 times a day, As Needed -for high ostomy output greater than 1 liter a day MDD:2   oxycodone-acetaminophen 5 mg-325 mg oral tablet: 1 tab(s) orally every 4 hours, As Needed -for severe pain MDD:4   tamsulosin 0.4 mg oral capsule: 1 cap(s) orally once a day (at bedtime)

## 2020-06-04 NOTE — ADVANCED PRACTICE NURSE CONSULT - ASSESSMENT
Loop ileostomy stoma pink, budded, measuring 1 1/4 inches, functioning with thick brown effluent. Peristomal skin intact. Patient  receptive to ostomy teaching. Patient changed pouching system with WOCN coaching. Applied Cavilon skin prep to peristomal skin, measured stoma, cut skin barrier to accommodate stoma, applied stoma ring around stoma, then [place] 2 piece flat 1 3/4 inch, cut to fit, lock and roll pouching system. Provided patient with ostomy supplies, written instructions for changing ostomy pouching system and ileostomy teaching guide. Placed list of required prescriptions for ostomy supplies and provided patient with a copy. Faxed [employer] enrollment form to [employer].

## 2020-06-04 NOTE — PROGRESS NOTE ADULT - SUBJECTIVE AND OBJECTIVE BOX
SUBJECTIVE:  pt seen at bedside, feels alright. eating okay without nausea    MEDICATIONS  (STANDING):  acetaminophen   Tablet .. 1000 milliGRAM(s) Oral every 6 hours  BUpivacaine liposome 1.3% Injectable (no eMAR) 20 milliLiter(s) Local Injection once  enoxaparin Injectable 40 milliGRAM(s) SubCutaneous daily  ketorolac   Injectable 15 milliGRAM(s) IV Push every 6 hours  tamsulosin 0.4 milliGRAM(s) Oral at bedtime    MEDICATIONS  (PRN):  HYDROmorphone  Injectable 0.5 milliGRAM(s) IV Push every 4 hours PRN Severe Pain (7 - 10)  ondansetron Injectable 4 milliGRAM(s) IV Push every 4 hours PRN Nausea and/or Vomiting      Vital Signs Last 24 Hrs  T(C): 36.8 (04 Jun 2020 05:09), Max: 36.9 (03 Jun 2020 20:52)  T(F): 98.2 (04 Jun 2020 05:09), Max: 98.4 (03 Jun 2020 20:52)  HR: 74 (04 Jun 2020 05:09) (74 - 96)  BP: 132/77 (04 Jun 2020 05:09) (132/77 - 160/64)  BP(mean): --  RR: 18 (03 Jun 2020 20:52) (16 - 18)  SpO2: 98% (03 Jun 2020 20:52) (97% - 100%)    Physical Exam:  General: NAD, resting comfortably in bed  Pulmonary: Nonlabored breathing, no respiratory distress  Cardiovascular: NSR  Abdominal: soft, NT/ND, incisions c/d/i, ostomy in place, pink and patent with stool and gas  Extremities: WWP, normal strength  Neuro: A/O x 3, no focal deficits, normal motor/sensation  Pulses: palpable distal pulses    I&O's Summary    03 Jun 2020 07:01  -  04 Jun 2020 07:00  --------------------------------------------------------  IN: 2160 mL / OUT: 3685 mL / NET: -1525 mL        LABS:                        12.1   6.21  )-----------( 193      ( 04 Jun 2020 06:35 )             36.9     06-04    142  |  109<H>  |  14  ----------------------------<  96  3.9   |  26  |  1.13    Ca    8.6      04 Jun 2020 06:35  Phos  1.9     06-04  Mg     2.0     06-04          CAPILLARY BLOOD GLUCOSE            RADIOLOGY & ADDITIONAL STUDIES:

## 2020-06-04 NOTE — DISCHARGE NOTE PROVIDER - CARE PROVIDER_API CALL
Ivan Rick  Colon and Rectal Surgery  [address] [place] [address]  Phone: [phone]  Fax: [phone]  Follow Up Time: 1 week

## 2020-06-04 NOTE — DISCHARGE NOTE NURSING/CASE MANAGEMENT/SOCIAL WORK - PATIENT PORTAL LINK FT
You can access the FollowMyHealth Patient Portal offered by [facility] by registering at the following website: [email or web address]. By joining [facility]’s FollowMyHealth portal, you will also be able to view your health information using other applications (apps) compatible with our system.

## 2020-06-04 NOTE — DISCHARGE NOTE PROVIDER - NSDCCPCAREPLAN_GEN_ALL_CORE_FT
PRINCIPAL DISCHARGE DIAGNOSIS  Diagnosis: Colon cancer  Assessment and Plan of Treatment: s/p sigmoidectomy and ileostomy creation      SECONDARY DISCHARGE DIAGNOSES  Diagnosis: Prostate CA  Assessment and Plan of Treatment: s/p brachytherapy    Diagnosis: Benign prostate hyperplasia  Assessment and Plan of Treatment: on flomax

## 2020-06-04 NOTE — ADVANCED PRACTICE NURSE CONSULT - RECOMMEDATIONS
The patient would benefit from home care services to reinforce ostomy teaching.   MARY ELLEN discussed the same with MARISA Hannon, surgery team and discharge planners.

## 2020-06-08 DIAGNOSIS — K21.9 GASTRO-ESOPHAGEAL REFLUX DISEASE WITHOUT ESOPHAGITIS: ICD-10-CM

## 2020-06-08 DIAGNOSIS — C18.7 MALIGNANT NEOPLASM OF SIGMOID COLON: ICD-10-CM

## 2020-06-08 DIAGNOSIS — Z85.46 PERSONAL HISTORY OF MALIGNANT NEOPLASM OF PROSTATE: ICD-10-CM

## 2020-06-08 LAB — SURGICAL PATHOLOGY STUDY: SIGNIFICANT CHANGE UP

## 2020-06-10 PROBLEM — C18.7 MALIGNANT NEOPLASM OF SIGMOID COLON: Chronic | Status: ACTIVE | Noted: 2020-06-02

## 2020-06-10 PROBLEM — N40.0 BENIGN PROSTATIC HYPERPLASIA WITHOUT LOWER URINARY TRACT SYMPTOMS: Chronic | Status: ACTIVE | Noted: 2020-06-02

## 2020-06-10 PROBLEM — C61 MALIGNANT NEOPLASM OF PROSTATE: Chronic | Status: ACTIVE | Noted: 2020-06-02

## 2020-06-19 ENCOUNTER — APPOINTMENT (OUTPATIENT)
Dept: COLORECTAL SURGERY | Facility: CLINIC | Age: 65
End: 2020-06-19
Payer: MEDICARE

## 2020-06-19 VITALS
BODY MASS INDEX: 25.62 KG/M2 | DIASTOLIC BLOOD PRESSURE: 78 MMHG | HEIGHT: 70 IN | SYSTOLIC BLOOD PRESSURE: 121 MMHG | TEMPERATURE: 98 F | WEIGHT: 179 LBS | HEART RATE: 84 BPM

## 2020-06-19 PROCEDURE — 99024 POSTOP FOLLOW-UP VISIT: CPT

## 2020-06-19 NOTE — ASSESSMENT
[FreeTextEntry1] : Stage 2- colon cancer.  No adverse risk factors.\par \par Advised increasing diet and activity.\par \par Return in 4 weeks for sigmoidoscopy and evaluation of anastomosis in preparation for planned ileostomy closure.\par \par Oncological follow up with Dr. Ho.\par

## 2020-06-19 NOTE — HISTORY OF PRESENT ILLNESS
[FreeTextEntry1] : 66 y/o M presents for f/u T3N0 sigmoid cancer\par S/p rectosigmoid colectomy 6/2/20\par Final Diagnosis\par \par 1. Rectosigmoid colon, resection:\par - Adenocarcinoma, 2.8 cm, architecturally moderately\par differentiated with high\par nuclear grade\par - Tumor invades through the muscularis propria, 5 mm into\par subserosal soft tissue\par - No lymphovascular or perineural invasion identified\par - Proximal, distal and mesenteric margins negative for tumor\par - Seventeen lymph nodes, negative for tumor (0/17)\par - Negative for tumor deposits\par \par 2. Proximal doughnut, excision:\par - Negative for tumor\par \par 3. Distal doughnut, excision:\par - Negative for tumor\par \par Comment:\par Testing for mismatch repair proteins was performed on the biopsy.\par See [facility]\par .\par \par Synoptic Summary\par Specimen: rectosigmoid colon\par Procedure: resection\par Specimen length: 28.5 cm\par Tumor site: (above or below the peritoneal reflection). above\par Tumor size: 2.8 cm\par Macroscopic tumor perforation: absent\par Histologic type: adenocarcinoma\par Histologic grade: moderately differentiated\par Microscopic tumor invasion: through muscularis propria into\par subserosal soft tissue\par Lymphocytic response, intratumoral or peritumoral: absent\par Margins\par Proximal margin: negative for tumor\par Distal margin: negative for tumor\par Circumferential (radial margin) or mesenteric margin:\par negative for tumor\par Deep margin: negative for tumor\par Distance from closest margin: 5.5 cm ( mesenteric margin)\par Distance from anal verge:\par Treatment effect: N/A\par Small lymphovascular invasion: not identified\par Venous (large vessel) invasion: not identified\par Perineural invasion: not identified\par Tumor deposits: absent\par Tumor border configuration: invasive\par Additional pathologic findings:\par Lymph nodes:\par Number examined: 17\par Number involved: 0\par Pathologic stage:  pT3  pN0\par \par Reports occasional incisional pain, using Tylenol PRN. Incisions healing well\par C/o dry mouth that is affecting sleeping, wants to begin using [employer] products\par Reports occasional nausea, denies vomiting. Has tried citrus fruits, apples and canned fruit without issue\par Reports energy slowly returning to normal \par BH: 2-4 times daily. Watery to paste like consistency. Changing appliance twice per week \par Denies irritation or bleeding from stoma \par

## 2020-06-19 NOTE — PHYSICAL EXAM
[Abdomen Masses] : No abdominal masses [No HSM] : no hepatosplenomegaly [Abdomen Tenderness] : ~T No ~M abdominal tenderness [de-identified] : incisions well healed.  ileostomy- functional. thick effluent

## 2020-07-17 ENCOUNTER — APPOINTMENT (OUTPATIENT)
Dept: COLORECTAL SURGERY | Facility: CLINIC | Age: 65
End: 2020-07-17
Payer: MEDICARE

## 2020-07-17 VITALS
TEMPERATURE: 98.2 F | HEIGHT: 70 IN | HEART RATE: 74 BPM | WEIGHT: 188 LBS | DIASTOLIC BLOOD PRESSURE: 81 MMHG | SYSTOLIC BLOOD PRESSURE: 135 MMHG | BODY MASS INDEX: 26.92 KG/M2

## 2020-07-17 PROCEDURE — 45330 DIAGNOSTIC SIGMOIDOSCOPY: CPT | Mod: 58

## 2020-07-17 PROCEDURE — 99214 OFFICE O/P EST MOD 30 MIN: CPT | Mod: 25

## 2020-07-17 PROCEDURE — 99024 POSTOP FOLLOW-UP VISIT: CPT

## 2020-07-17 NOTE — HISTORY OF PRESENT ILLNESS
[FreeTextEntry1] : 66 y/o M presents for f/u T3N0 sigmoid colon cancer\par S/p rectosigmoid colectomy 6/2/20\par Denies abdominal or rectal pain, no longer using Tylenol\par Incisions have healed well\par Reports good energy levels. Has a good appetite but still following a low fiber diet\par BH:3-5 paste like outputs daily\par Changing appliance twice weekly. Denies irritation or bleeding from stoma \par

## 2020-07-17 NOTE — PHYSICAL EXAM
[Abdomen Masses] : No abdominal masses [Abdomen Tenderness] : ~T No ~M abdominal tenderness [No HSM] : no hepatosplenomegaly [Normal] : was normal [None] : there was no rectal mass  [FreeTextEntry1] : The risks , benefits and alternatives of the procedure were reviewed with the patient. The patient consents to the planned procedure.\par \par The flexible sigmoidoscope was passed through the anus into the rectum. The scope was passed to    35  cm from the anal verge.\par \par The findings revealed:\par \par well healed anastomosis at 14cm. widely patent.\par  [de-identified] : incisions well healed.  ileostomy- functional. thick effluent

## 2020-08-24 ENCOUNTER — LABORATORY RESULT (OUTPATIENT)
Age: 65
End: 2020-08-24

## 2020-08-25 ENCOUNTER — NON-APPOINTMENT (OUTPATIENT)
Age: 65
End: 2020-08-25

## 2020-08-25 ENCOUNTER — APPOINTMENT (OUTPATIENT)
Dept: HEART AND VASCULAR | Facility: CLINIC | Age: 65
End: 2020-08-25
Payer: MEDICARE

## 2020-08-25 VITALS
WEIGHT: 187 LBS | HEART RATE: 85 BPM | TEMPERATURE: 94.4 F | DIASTOLIC BLOOD PRESSURE: 78 MMHG | OXYGEN SATURATION: 99 % | BODY MASS INDEX: 26.77 KG/M2 | HEIGHT: 70 IN | SYSTOLIC BLOOD PRESSURE: 130 MMHG

## 2020-08-25 DIAGNOSIS — Z78.9 OTHER SPECIFIED HEALTH STATUS: ICD-10-CM

## 2020-08-25 DIAGNOSIS — I10 ESSENTIAL (PRIMARY) HYPERTENSION: ICD-10-CM

## 2020-08-25 PROCEDURE — 93000 ELECTROCARDIOGRAM COMPLETE: CPT

## 2020-08-25 PROCEDURE — 99213 OFFICE O/P EST LOW 20 MIN: CPT

## 2020-08-25 RX ORDER — MOMETASONE FUROATE 220 UG/1
220 INHALANT RESPIRATORY (INHALATION)
Qty: 1 | Refills: 0 | Status: ACTIVE | COMMUNITY
Start: 2019-11-22

## 2020-08-25 RX ORDER — OXYCODONE AND ACETAMINOPHEN 5; 325 MG/1; MG/1
5-325 TABLET ORAL
Qty: 10 | Refills: 0 | Status: DISCONTINUED | COMMUNITY
Start: 2020-06-04 | End: 2020-08-25

## 2020-08-25 RX ORDER — TAMSULOSIN HYDROCHLORIDE 0.4 MG/1
0.4 CAPSULE ORAL
Qty: 90 | Refills: 3 | Status: ACTIVE | COMMUNITY
Start: 2020-08-25

## 2020-08-25 RX ORDER — LEVALBUTEROL TARTRATE 45 UG/1
45 AEROSOL, METERED ORAL
Qty: 15 | Refills: 0 | Status: ACTIVE | COMMUNITY
Start: 2019-11-22

## 2020-08-25 RX ORDER — DIPHENOXYLATE HYDROCHLORIDE AND ATROPINE SULFATE 2.5; .025 MG/1; MG/1
2.5-0.025 TABLET ORAL
Qty: 180 | Refills: 0 | Status: ACTIVE | COMMUNITY
Start: 2020-07-28

## 2020-08-25 RX ORDER — OMEPRAZOLE 40 MG/1
40 CAPSULE, DELAYED RELEASE ORAL
Qty: 30 | Refills: 0 | Status: DISCONTINUED | COMMUNITY
Start: 2020-03-03 | End: 2020-08-25

## 2020-08-25 RX ORDER — TAMSULOSIN HYDROCHLORIDE 0.4 MG/1
0.4 CAPSULE ORAL
Refills: 0 | Status: DISCONTINUED | COMMUNITY
End: 2020-08-25

## 2020-08-25 NOTE — REVIEW OF SYSTEMS
[Urinary Frequency] : urinary frequency [Change In The Stool] : change in stool [Tingling (Paresthesia)] : tingling [Negative] : Heme/Lymph

## 2020-08-25 NOTE — REASON FOR VISIT
[FreeTextEntry1] : 66 y/o AAM presents for f/u T3N0 sigmoid colon cancer\par S/p rectosigmoid colectomy 6/2/20\par Pt to have surgery 8/27/20 for removal of stoma/colostomy.\par \par No DM, HLD, never smoked.  2 BP readings are mildly elevated.

## 2020-08-25 NOTE — PHYSICAL EXAM
[General Appearance - Well Developed] : well developed [Normal Appearance] : normal appearance [Well Groomed] : well groomed [No Deformities] : no deformities [General Appearance - In No Acute Distress] : no acute distress [General Appearance - Well Nourished] : well nourished [Normal Conjunctiva] : the conjunctiva exhibited no abnormalities [Eyelids - No Xanthelasma] : the eyelids demonstrated no xanthelasmas [Respiration, Rhythm And Depth] : normal respiratory rhythm and effort [Exaggerated Use Of Accessory Muscles For Inspiration] : no accessory muscle use [Auscultation Breath Sounds / Voice Sounds] : lungs were clear to auscultation bilaterally [Heart Rate And Rhythm] : heart rate and rhythm were normal [Heart Sounds] : normal S1 and S2 [Abdomen Soft] : soft [Abdomen Tenderness] : non-tender [Abdomen Mass (___ Cm)] : no abdominal mass palpated [Abnormal Walk] : normal gait [Gait - Sufficient For Exercise Testing] : the gait was sufficient for exercise testing [Cyanosis, Localized] : no localized cyanosis [Nail Clubbing] : no clubbing of the fingernails [Petechial Hemorrhages (___cm)] : no petechial hemorrhages [Skin Color & Pigmentation] : normal skin color and pigmentation [No Venous Stasis] : no venous stasis [] : no rash [No Xanthoma] : no  xanthoma was observed [Skin Lesions] : no skin lesions [No Skin Ulcers] : no skin ulcer [Affect] : the affect was normal [Oriented To Time, Place, And Person] : oriented to person, place, and time [Mood] : the mood was normal [No Anxiety] : not feeling anxious [FreeTextEntry1] : 1/6 GARY

## 2020-08-25 NOTE — ASSESSMENT
[FreeTextEntry1] : PreOp- Pt is low risk from a CV standpoint.  He is cleared to proceed.  Labs done by Dr Ho.  He did well with surgery in June, he is cleared again to proceed.\par \par Murmur- Will echo but he does not want to travel from [place] via subway so we will defer this for now.\par \par Mild HTN- most likely he is hypertensive.  Meds deferred prior to surgery, not needed at this time.

## 2020-08-26 ENCOUNTER — TRANSCRIPTION ENCOUNTER (OUTPATIENT)
Age: 65
End: 2020-08-26

## 2020-08-26 NOTE — PATIENT PROFILE ADULT - PRO INTERPRETER NEED 2
13 y/o male with 12 hours of umbilical and rlq sharp stabbing, constant, 10/10 pain associated with nausea, vomiting, anorexia and chills.  NO priors.  NO loose stools, no blood in stool.  
English

## 2020-08-27 ENCOUNTER — INPATIENT (INPATIENT)
Facility: HOSPITAL | Age: 65
LOS: 1 days | Discharge: HOME | DRG: 331 | End: 2020-08-29
Attending: SURGERY | Admitting: SURGERY
Payer: MEDICARE

## 2020-08-27 ENCOUNTER — RESULT REVIEW (OUTPATIENT)
Age: 65
End: 2020-08-27

## 2020-08-27 ENCOUNTER — APPOINTMENT (OUTPATIENT)
Dept: COLORECTAL SURGERY | Facility: HOSPITAL | Age: 65
End: 2020-08-27

## 2020-08-27 VITALS
SYSTOLIC BLOOD PRESSURE: 153 MMHG | DIASTOLIC BLOOD PRESSURE: 77 MMHG | RESPIRATION RATE: 16 BRPM | HEIGHT: 66 IN | HEART RATE: 86 BPM | TEMPERATURE: 97 F | WEIGHT: 183.87 LBS

## 2020-08-27 DIAGNOSIS — Z41.9 ENCOUNTER FOR PROCEDURE FOR PURPOSES OTHER THAN REMEDYING HEALTH STATE, UNSPECIFIED: Chronic | ICD-10-CM

## 2020-08-27 PROCEDURE — 88304 TISSUE EXAM BY PATHOLOGIST: CPT | Mod: 26

## 2020-08-27 PROCEDURE — 44625 REPAIR BOWEL OPENING: CPT | Mod: 58,GC

## 2020-08-27 RX ORDER — HEPARIN SODIUM 5000 [USP'U]/ML
5000 INJECTION INTRAVENOUS; SUBCUTANEOUS ONCE
Refills: 0 | Status: COMPLETED | OUTPATIENT
Start: 2020-08-27 | End: 2020-08-27

## 2020-08-27 RX ORDER — PANTOPRAZOLE SODIUM 20 MG/1
40 TABLET, DELAYED RELEASE ORAL DAILY
Refills: 0 | Status: DISCONTINUED | OUTPATIENT
Start: 2020-08-27 | End: 2020-08-29

## 2020-08-27 RX ORDER — GABAPENTIN 400 MG/1
600 CAPSULE ORAL ONCE
Refills: 0 | Status: COMPLETED | OUTPATIENT
Start: 2020-08-27 | End: 2020-08-27

## 2020-08-27 RX ORDER — BUPIVACAINE 13.3 MG/ML
20 INJECTION, SUSPENSION, LIPOSOMAL INFILTRATION ONCE
Refills: 0 | Status: DISCONTINUED | OUTPATIENT
Start: 2020-08-27 | End: 2020-08-29

## 2020-08-27 RX ORDER — ENOXAPARIN SODIUM 100 MG/ML
40 INJECTION SUBCUTANEOUS AT BEDTIME
Refills: 0 | Status: DISCONTINUED | OUTPATIENT
Start: 2020-08-27 | End: 2020-08-29

## 2020-08-27 RX ORDER — ONDANSETRON 8 MG/1
4 TABLET, FILM COATED ORAL EVERY 6 HOURS
Refills: 0 | Status: DISCONTINUED | OUTPATIENT
Start: 2020-08-27 | End: 2020-08-29

## 2020-08-27 RX ORDER — SODIUM CHLORIDE 9 MG/ML
1000 INJECTION, SOLUTION INTRAVENOUS
Refills: 0 | Status: DISCONTINUED | OUTPATIENT
Start: 2020-08-27 | End: 2020-08-28

## 2020-08-27 RX ORDER — OXYCODONE HYDROCHLORIDE 5 MG/1
2.5 TABLET ORAL EVERY 4 HOURS
Refills: 0 | Status: DISCONTINUED | OUTPATIENT
Start: 2020-08-27 | End: 2020-08-29

## 2020-08-27 RX ORDER — OXYCODONE HYDROCHLORIDE 5 MG/1
5 TABLET ORAL EVERY 4 HOURS
Refills: 0 | Status: DISCONTINUED | OUTPATIENT
Start: 2020-08-27 | End: 2020-08-29

## 2020-08-27 RX ORDER — ACETAMINOPHEN 500 MG
650 TABLET ORAL EVERY 4 HOURS
Refills: 0 | Status: DISCONTINUED | OUTPATIENT
Start: 2020-08-27 | End: 2020-08-29

## 2020-08-27 RX ORDER — ACETAMINOPHEN 500 MG
1000 TABLET ORAL ONCE
Refills: 0 | Status: COMPLETED | OUTPATIENT
Start: 2020-08-27 | End: 2020-08-27

## 2020-08-27 RX ORDER — HYDROMORPHONE HYDROCHLORIDE 2 MG/ML
0.5 INJECTION INTRAMUSCULAR; INTRAVENOUS; SUBCUTANEOUS EVERY 4 HOURS
Refills: 0 | Status: DISCONTINUED | OUTPATIENT
Start: 2020-08-27 | End: 2020-08-28

## 2020-08-27 RX ORDER — TAMSULOSIN HYDROCHLORIDE 0.4 MG/1
0.4 CAPSULE ORAL AT BEDTIME
Refills: 0 | Status: DISCONTINUED | OUTPATIENT
Start: 2020-08-27 | End: 2020-08-29

## 2020-08-27 RX ADMIN — TAMSULOSIN HYDROCHLORIDE 0.4 MILLIGRAM(S): 0.4 CAPSULE ORAL at 21:41

## 2020-08-27 RX ADMIN — OXYCODONE HYDROCHLORIDE 5 MILLIGRAM(S): 5 TABLET ORAL at 12:51

## 2020-08-27 RX ADMIN — SODIUM CHLORIDE 40 MILLILITER(S): 9 INJECTION, SOLUTION INTRAVENOUS at 12:55

## 2020-08-27 RX ADMIN — HYDROMORPHONE HYDROCHLORIDE 0.5 MILLIGRAM(S): 2 INJECTION INTRAMUSCULAR; INTRAVENOUS; SUBCUTANEOUS at 12:26

## 2020-08-27 RX ADMIN — Medication 650 MILLIGRAM(S): at 18:02

## 2020-08-27 RX ADMIN — OXYCODONE HYDROCHLORIDE 5 MILLIGRAM(S): 5 TABLET ORAL at 13:23

## 2020-08-27 RX ADMIN — ENOXAPARIN SODIUM 40 MILLIGRAM(S): 100 INJECTION SUBCUTANEOUS at 21:41

## 2020-08-27 RX ADMIN — HYDROMORPHONE HYDROCHLORIDE 0.5 MILLIGRAM(S): 2 INJECTION INTRAMUSCULAR; INTRAVENOUS; SUBCUTANEOUS at 12:57

## 2020-08-27 RX ADMIN — Medication 650 MILLIGRAM(S): at 14:11

## 2020-08-27 RX ADMIN — Medication 650 MILLIGRAM(S): at 17:18

## 2020-08-27 RX ADMIN — Medication 1000 MILLIGRAM(S): at 08:03

## 2020-08-27 RX ADMIN — PANTOPRAZOLE SODIUM 40 MILLIGRAM(S): 20 TABLET, DELAYED RELEASE ORAL at 12:50

## 2020-08-27 RX ADMIN — Medication 650 MILLIGRAM(S): at 21:41

## 2020-08-27 RX ADMIN — GABAPENTIN 600 MILLIGRAM(S): 400 CAPSULE ORAL at 08:02

## 2020-08-27 RX ADMIN — HEPARIN SODIUM 5000 UNIT(S): 5000 INJECTION INTRAVENOUS; SUBCUTANEOUS at 08:02

## 2020-08-27 RX ADMIN — Medication 650 MILLIGRAM(S): at 22:30

## 2020-08-27 NOTE — CONSULT NOTE ADULT - ASSESSMENT
the patient completed his adjuvant chemotherapy and radiation therapy to a complete response and presently is admitted for closure of colostomy.  The patient is medically clear for this surgery.

## 2020-08-27 NOTE — BRIEF OPERATIVE NOTE - OPERATION/FINDINGS
Reversal of a RLQ diverting loop ileostomy via direct cutdown, enterolysis and subsequent ileostomy resection and creation of side-to-side functional end-to-end ileoileostomy using EndoGIA purple 60mm x 2 and 60 FAMILIA stapler x 1 in Manohar fashion. Fascia closed primarily using interrupted figure-of-8 PDS and skin partially closed with pursestring monocryl. Wound packed with betadine soaked gauze. Hemostasis achieved at the end of the case.

## 2020-08-27 NOTE — PROGRESS NOTE ADULT - SUBJECTIVE AND OBJECTIVE BOX
Post-Operative Check @1400    Procedure: ileostomy reversal  Surgeon: Dr. Rick    S: Pt complains of RLQ abdominal pain near prior ostomy site. Denies N/V, CP, SOB, and calf tenderness. Pain controlled with medication. Denies discomfort from the mac catheter.       PE:   Afebrile, hemodynamically stable, slightly hypertensive (140-170s), saturating well   Gen: NAD, resting comfortably in bed  C/V: pulses present and strong in the b/l upper extremities   Pulm: Nonlabored breathing, no respiratory distress  Abd: soft, nondistended, appropriately tender to palpation of RLQ near prior ostomy site, ostomy site covered with clean guaze and tape with some betadine seepage  : mac catheter in place draining yellow urine, nonbloody  Extrem: WWP, no calf edema, SCDs in place and fxnl

## 2020-08-27 NOTE — H&P ADULT - ASSESSMENT
65M with a hx of a pT3N0 rectosigmoid cancer s/p neoadjuvant CRT and resection with diversion now here for an elective loop ileostomy closure.    - Will admit to surgical service post op, team 1, Dr. Rick, regional bed.  - ERAS protocol

## 2020-08-27 NOTE — H&P ADULT - NSHPPHYSICALEXAM_GEN_ALL_CORE
Age appropriate male in NAD  CV: RRR  Resp: No increased work of breathing  Abd: S/NT/ND. RLQ ileostomy p/p/p  Ext: WWP

## 2020-08-27 NOTE — PACU DISCHARGE NOTE - COMMENTS
Report given to receiving RN and pain remain stable and no distress noted, dressing have small amount of oozing of drainage. Vital signs stable see flow sheet. Awaiting transport via bed.

## 2020-08-27 NOTE — PROGRESS NOTE ADULT - ASSESSMENT
65yMale s/p ileostomy reversal    Diet: CLD  IVF: LR @40  Protonix   Pain/nausea control: no toradol  Mcmillan   DVT ppx: lovenox,, SCDs    Plan discussed with attending and chief resident.   ____________________________________________________  Stacey Elizabeth MD     PGY1 - Surgery Team 1

## 2020-08-27 NOTE — H&P ADULT - HISTORY OF PRESENT ILLNESS
65M with PMH of prostate CA about 4 years ago s/p brachytherapy, BPH, rectosigmoid cancer s/p neoadjuvant CRT and elective robotic assisted laparoscopic rectosigmoid colectomy and diverting loop ileostomy creation in June 2020. Pathology showed pT3N0 (0/17) now presenting for an elective loop ileostomy reversal. Pt denies any recent fevers, chills, nausea, vomiting. Functional ileostomy. Denies any recent CP/SOB

## 2020-08-27 NOTE — BRIEF OPERATIVE NOTE - NSICDXBRIEFPOSTOP_GEN_ALL_CORE_FT
POST-OP DIAGNOSIS:  S/P ileostomy 27-Aug-2020 11:25:54  Jorge A Richardson  Rectal cancer 27-Aug-2020 11:25:43  Jorge A Richardson

## 2020-08-27 NOTE — BRIEF OPERATIVE NOTE - NSICDXBRIEFPREOP_GEN_ALL_CORE_FT
PRE-OP DIAGNOSIS:  S/P ileostomy 27-Aug-2020 11:25:33  Jorge A Richardson  Rectal cancer 27-Aug-2020 11:25:25  Jorge A Richardson

## 2020-08-28 ENCOUNTER — TRANSCRIPTION ENCOUNTER (OUTPATIENT)
Age: 65
End: 2020-08-28

## 2020-08-28 LAB
ANION GAP SERPL CALC-SCNC: 12 MMOL/L — SIGNIFICANT CHANGE UP (ref 5–17)
BLD GP AB SCN SERPL QL: NEGATIVE — SIGNIFICANT CHANGE UP
BUN SERPL-MCNC: 14 MG/DL — SIGNIFICANT CHANGE UP (ref 7–23)
CALCIUM SERPL-MCNC: 9.4 MG/DL — SIGNIFICANT CHANGE UP (ref 8.4–10.5)
CHLORIDE SERPL-SCNC: 106 MMOL/L — SIGNIFICANT CHANGE UP (ref 96–108)
CO2 SERPL-SCNC: 21 MMOL/L — LOW (ref 22–31)
CREAT SERPL-MCNC: 1.13 MG/DL — SIGNIFICANT CHANGE UP (ref 0.5–1.3)
GLUCOSE SERPL-MCNC: 134 MG/DL — HIGH (ref 70–99)
HCT VFR BLD CALC: 43.6 % — SIGNIFICANT CHANGE UP (ref 39–50)
HGB BLD-MCNC: 14.4 G/DL — SIGNIFICANT CHANGE UP (ref 13–17)
MAGNESIUM SERPL-MCNC: 2.2 MG/DL — SIGNIFICANT CHANGE UP (ref 1.6–2.6)
MCHC RBC-ENTMCNC: 31.4 PG — SIGNIFICANT CHANGE UP (ref 27–34)
MCHC RBC-ENTMCNC: 33 GM/DL — SIGNIFICANT CHANGE UP (ref 32–36)
MCV RBC AUTO: 95.2 FL — SIGNIFICANT CHANGE UP (ref 80–100)
NRBC # BLD: 0 /100 WBCS — SIGNIFICANT CHANGE UP (ref 0–0)
PHOSPHATE SERPL-MCNC: 3.1 MG/DL — SIGNIFICANT CHANGE UP (ref 2.5–4.5)
PLATELET # BLD AUTO: 231 K/UL — SIGNIFICANT CHANGE UP (ref 150–400)
POTASSIUM SERPL-MCNC: 4.4 MMOL/L — SIGNIFICANT CHANGE UP (ref 3.5–5.3)
POTASSIUM SERPL-SCNC: 4.4 MMOL/L — SIGNIFICANT CHANGE UP (ref 3.5–5.3)
RBC # BLD: 4.58 M/UL — SIGNIFICANT CHANGE UP (ref 4.2–5.8)
RBC # FLD: 12.3 % — SIGNIFICANT CHANGE UP (ref 10.3–14.5)
RH IG SCN BLD-IMP: POSITIVE — SIGNIFICANT CHANGE UP
SODIUM SERPL-SCNC: 139 MMOL/L — SIGNIFICANT CHANGE UP (ref 135–145)
WBC # BLD: 8.05 K/UL — SIGNIFICANT CHANGE UP (ref 3.8–10.5)
WBC # FLD AUTO: 8.05 K/UL — SIGNIFICANT CHANGE UP (ref 3.8–10.5)

## 2020-08-28 RX ADMIN — Medication 650 MILLIGRAM(S): at 17:22

## 2020-08-28 RX ADMIN — PANTOPRAZOLE SODIUM 40 MILLIGRAM(S): 20 TABLET, DELAYED RELEASE ORAL at 11:08

## 2020-08-28 RX ADMIN — Medication 650 MILLIGRAM(S): at 05:55

## 2020-08-28 RX ADMIN — TAMSULOSIN HYDROCHLORIDE 0.4 MILLIGRAM(S): 0.4 CAPSULE ORAL at 22:04

## 2020-08-28 RX ADMIN — Medication 650 MILLIGRAM(S): at 17:52

## 2020-08-28 RX ADMIN — ENOXAPARIN SODIUM 40 MILLIGRAM(S): 100 INJECTION SUBCUTANEOUS at 22:04

## 2020-08-28 RX ADMIN — Medication 650 MILLIGRAM(S): at 13:49

## 2020-08-28 RX ADMIN — Medication 650 MILLIGRAM(S): at 22:30

## 2020-08-28 RX ADMIN — Medication 650 MILLIGRAM(S): at 11:12

## 2020-08-28 RX ADMIN — Medication 650 MILLIGRAM(S): at 10:16

## 2020-08-28 RX ADMIN — Medication 650 MILLIGRAM(S): at 01:48

## 2020-08-28 RX ADMIN — Medication 650 MILLIGRAM(S): at 13:19

## 2020-08-28 RX ADMIN — Medication 650 MILLIGRAM(S): at 22:04

## 2020-08-28 RX ADMIN — Medication 650 MILLIGRAM(S): at 02:48

## 2020-08-28 RX ADMIN — Medication 650 MILLIGRAM(S): at 06:22

## 2020-08-28 NOTE — PROGRESS NOTE ADULT - SUBJECTIVE AND OBJECTIVE BOX
the patient is quite grateful that he has had closure of his ileostomy.  He has some degree of right lower quadrant pain.  His pain control is adequate.    Performance Status ­ECOG 0: Fully active, able to carry on all pre­disease performance without restriction Patient appears well nourished.  Skin:Normal Normal turgor. No rash noted. No lesions.   Eyes: Eyes with PERRLA. EOM's in­tact Sclera clear,no jaundice noted. Conjunctiva clear.   Ears, Nose,Mouth & Throat: Teeth normal, no missing teeth or dentures present. No ulcers. No thrush Auditory canals normal.  Description: %% Oropharynx without lesions. Mucositis None   Neck: No thyromegaly. No lymphadenopathy noted.   Cardiovascular: Normal sinus rhythm S1, S2 without murmurs. No Extrasystole.   Chest/Respiratory: No dyspnea. No rhonchi. No wheezing. No decreased breath sounds at bases. No rales. Unlabored breathing. No  accessory muscle use.   Breast: Normal.  Gastrointestinal: Normal Abdomen is soft and non­tender to touch. No hepatomegaly noted. No splenomegaly noted. No abdominal pain  or guarding noted. No abdominal mass(es) appreciated. No ascites noted. The patient has clean dry wounds from his ileostomy closure.  Hematologic/Lymphatic: No petechiae noted. No purpura noted. No lymphadenopathy noted.   Musculoskeletal: No Kyphosis. No weakness. No spinal tenderness on percussion. No pain on hip rotation reported by patient. Normal  range of motion in upper and lower extremities.   Neurologic: Affect normal. No tremors observed. Biceps, brachioradialis, patellar and plantar reflexes symmetrical. Normal  proprioception/position sense Cranial nerves intact.   Psychiatry: Oriented to person/place/time. No anxiety observed during exam. No signs of depression noted during exam. Intact short­term  and long­term memory.     Extremities have no chronic venous stasis changes.

## 2020-08-28 NOTE — DISCHARGE NOTE PROVIDER - NSDCFUADDINST_GEN_ALL_CORE_FT
Follow up with Dr. Rick in 1-2 weeks. Call the office at [phone] to schedule your appointment. You may shower; soap and water over incision sites. Do not scrub. Pat dry when done. No tub bathing or swimming until cleared. Keep incision sites out of the sun as scars will darken. Ambulate as tolerated, but no heavy lifting (>10lbs) or strenuous exercise. You should be urinating at least 3-4x per day. Call the office if you experience increasing abdominal pain, nausea, vomiting, or temperature >101 F. Follow up with Dr. Rick in 1-2 weeks. Call the office at [phone] to schedule your appointment. You may shower; soap and water over incision sites. Do not scrub. Pat dry when done. No tub bathing or swimming until cleared. Keep incision sites out of the sun as scars will darken. Ambulate as tolerated, but no heavy lifting (>10lbs) or strenuous exercise. You should be urinating at least 3-4x per day. Call the office if you experience increasing abdominal pain, nausea, vomiting, or temperature >101 F.    Wound care:  Please exchange your dressing once a day after showering.

## 2020-08-28 NOTE — PROGRESS NOTE ADULT - ASSESSMENT
65M with a hx of a pT3N0 rectosigmoid cancer s/p neoadjuvant CRT and resection with diversion now here for elective surgery, pt is now s/p ileostomy reversal 8/27.    - Pain/nausea control prn  - CLD/IVF  - Lovenox/SCDs  - Mcmillan removed at bedside this am, TOV pending   - AM labs

## 2020-08-28 NOTE — DISCHARGE NOTE PROVIDER - CARE PROVIDER_API CALL
Ivan Rick  COLON/RECTAL SURGERY  [address], [address] [place] [address]  Phone: [phone]  Fax: [phone]  Follow Up Time:

## 2020-08-28 NOTE — DISCHARGE NOTE PROVIDER - NSDCMRMEDTOKEN_GEN_ALL_CORE_FT
tamsulosin 0.4 mg oral capsule: 1 cap(s) orally once a day (at bedtime) Colace 100 mg oral capsule: 1 cap(s) orally 2 times a day MDD:2 capsules  tamsulosin 0.4 mg oral capsule: 1 cap(s) orally once a day (at bedtime)

## 2020-08-28 NOTE — DISCHARGE NOTE PROVIDER - HOSPITAL COURSE
65M with a hx of a pT3N0 rectosigmoid cancer s/p neoadjuvant CRT and resection with diversion presented to [facility] for elective surgery. Pt was taken to the OR on 8/27/2020 for ileostomy reversal. Transferred to PACU in stable condition. No pattie operative complications noted. Diet was advanced as tolerated and per return of bowel function. At time of discharge pt is tolerating diet, pain is well controlled, pt is ambulating/voiding freely, has adequate bowel function. Pt is HD stable and medically ready for discharge.            **INCOMPLETE** 65M with a hx of a pT3N0 rectosigmoid cancer s/p neoadjuvant CRT and resection with diversion presented to [facility] for elective surgery. Pt was taken to the OR on 8/27/2020 for ileostomy reversal. Transferred to PACU in stable condition. No pattie operative complications noted. Diet was advanced as tolerated and per return of bowel function. At time of discharge pt is tolerating diet, pain is well controlled, pt is ambulating/voiding freely, has adequate bowel function. Pt is HD stable and medically ready for discharge.

## 2020-08-28 NOTE — PROGRESS NOTE ADULT - SUBJECTIVE AND OBJECTIVE BOX
INTERVAL HPI/OVERNIGHT EVENTS: Tolerating CLD, -F/-BM. VSS    STATUS POST: ileostomy reversal 8/27    POST OPERATIVE DAY #: 1    SUBJECTIVE: Pt seen and examined at bedside this am by surgery team. Tolerating diet, pain well controlled. +F/-BM. Denies f/n/v/cp/sob.    MEDICATIONS  (STANDING):  acetaminophen   Tablet .. 650 milliGRAM(s) Oral every 4 hours  BUpivacaine liposome 1.3% Injectable (no eMAR) 20 milliLiter(s) Local Injection once  enoxaparin Injectable 40 milliGRAM(s) SubCutaneous at bedtime  lactated ringers. 1000 milliLiter(s) (40 mL/Hr) IV Continuous <Continuous>  pantoprazole  Injectable 40 milliGRAM(s) IV Push daily  tamsulosin 0.4 milliGRAM(s) Oral at bedtime    MEDICATIONS  (PRN):  HYDROmorphone  Injectable 0.5 milliGRAM(s) IV Push every 4 hours PRN breakthrough pain  ondansetron Injectable 4 milliGRAM(s) IV Push every 6 hours PRN Nausea  oxyCODONE    IR 5 milliGRAM(s) Oral every 4 hours PRN Severe Pain (7 - 10)  oxyCODONE    IR 2.5 milliGRAM(s) Oral every 4 hours PRN Moderate Pain (4 - 6)      Vital Signs Last 24 Hrs  T(C): 37.1 (28 Aug 2020 05:03), Max: 37.3 (27 Aug 2020 13:37)  T(F): 98.8 (28 Aug 2020 05:03), Max: 99.1 (27 Aug 2020 13:37)  HR: 79 (28 Aug 2020 05:03) (63 - 89)  BP: 156/67 (28 Aug 2020 05:03) (128/82 - 171/100)  BP(mean): 114 (27 Aug 2020 14:07) (111 - 129)  RR: 18 (28 Aug 2020 05:03) (9 - 18)  SpO2: 99% (28 Aug 2020 05:03) (97% - 100%)    PHYSICAL EXAM:    Constitutional: A&Ox3, NAD    Respiratory: non labored breathing, no respiratory distress    Cardiovascular: NSR, RRR    Gastrointestinal: abdomen soft, nd, appropriately ttp to incisions. Dressing c/d/i.    Genitourinary: Mcmillan in place draining clear yellow urine     Extremities: wwp, no calf tenderness or edema. SCDs in place       I&O's Detail    27 Aug 2020 07:01  -  28 Aug 2020 07:00  --------------------------------------------------------  IN:    lactated ringers.: 640 mL  Total IN: 640 mL    OUT:    Indwelling Catheter - Urethral: 1520 mL  Total OUT: 1520 mL    Total NET: -880 mL          LABS:    08-28    139  |  106  |  14  ----------------------------<  134<H>  4.4   |  21<L>  |  1.13    Ca    9.4      28 Aug 2020 07:11  Phos  3.1     08-28  Mg     2.2     08-28            RADIOLOGY & ADDITIONAL STUDIES:

## 2020-08-29 ENCOUNTER — TRANSCRIPTION ENCOUNTER (OUTPATIENT)
Age: 65
End: 2020-08-29

## 2020-08-29 VITALS
RESPIRATION RATE: 18 BRPM | TEMPERATURE: 98 F | SYSTOLIC BLOOD PRESSURE: 123 MMHG | HEART RATE: 66 BPM | DIASTOLIC BLOOD PRESSURE: 79 MMHG | OXYGEN SATURATION: 97 %

## 2020-08-29 LAB
ANION GAP SERPL CALC-SCNC: 9 MMOL/L — SIGNIFICANT CHANGE UP (ref 5–17)
BUN SERPL-MCNC: 14 MG/DL — SIGNIFICANT CHANGE UP (ref 7–23)
CALCIUM SERPL-MCNC: 8.8 MG/DL — SIGNIFICANT CHANGE UP (ref 8.4–10.5)
CHLORIDE SERPL-SCNC: 110 MMOL/L — HIGH (ref 96–108)
CO2 SERPL-SCNC: 24 MMOL/L — SIGNIFICANT CHANGE UP (ref 22–31)
CREAT SERPL-MCNC: 1.07 MG/DL — SIGNIFICANT CHANGE UP (ref 0.5–1.3)
GLUCOSE SERPL-MCNC: 97 MG/DL — SIGNIFICANT CHANGE UP (ref 70–99)
HCT VFR BLD CALC: 40.8 % — SIGNIFICANT CHANGE UP (ref 39–50)
HGB BLD-MCNC: 13.8 G/DL — SIGNIFICANT CHANGE UP (ref 13–17)
MAGNESIUM SERPL-MCNC: 2.3 MG/DL — SIGNIFICANT CHANGE UP (ref 1.6–2.6)
MCHC RBC-ENTMCNC: 31 PG — SIGNIFICANT CHANGE UP (ref 27–34)
MCHC RBC-ENTMCNC: 33.8 GM/DL — SIGNIFICANT CHANGE UP (ref 32–36)
MCV RBC AUTO: 91.7 FL — SIGNIFICANT CHANGE UP (ref 80–100)
NRBC # BLD: 0 /100 WBCS — SIGNIFICANT CHANGE UP (ref 0–0)
PHOSPHATE SERPL-MCNC: 1.8 MG/DL — LOW (ref 2.5–4.5)
PLATELET # BLD AUTO: 207 K/UL — SIGNIFICANT CHANGE UP (ref 150–400)
POTASSIUM SERPL-MCNC: 3.3 MMOL/L — LOW (ref 3.5–5.3)
POTASSIUM SERPL-SCNC: 3.3 MMOL/L — LOW (ref 3.5–5.3)
RBC # BLD: 4.45 M/UL — SIGNIFICANT CHANGE UP (ref 4.2–5.8)
RBC # FLD: 12.4 % — SIGNIFICANT CHANGE UP (ref 10.3–14.5)
SODIUM SERPL-SCNC: 143 MMOL/L — SIGNIFICANT CHANGE UP (ref 135–145)
WBC # BLD: 7.13 K/UL — SIGNIFICANT CHANGE UP (ref 3.8–10.5)
WBC # FLD AUTO: 7.13 K/UL — SIGNIFICANT CHANGE UP (ref 3.8–10.5)

## 2020-08-29 PROCEDURE — 86850 RBC ANTIBODY SCREEN: CPT

## 2020-08-29 PROCEDURE — 86901 BLOOD TYPING SEROLOGIC RH(D): CPT

## 2020-08-29 PROCEDURE — 36415 COLL VENOUS BLD VENIPUNCTURE: CPT

## 2020-08-29 PROCEDURE — C1889: CPT

## 2020-08-29 PROCEDURE — 83735 ASSAY OF MAGNESIUM: CPT

## 2020-08-29 PROCEDURE — 80048 BASIC METABOLIC PNL TOTAL CA: CPT

## 2020-08-29 PROCEDURE — 84100 ASSAY OF PHOSPHORUS: CPT

## 2020-08-29 PROCEDURE — 88304 TISSUE EXAM BY PATHOLOGIST: CPT

## 2020-08-29 PROCEDURE — 82962 GLUCOSE BLOOD TEST: CPT

## 2020-08-29 PROCEDURE — 85027 COMPLETE CBC AUTOMATED: CPT

## 2020-08-29 RX ORDER — POTASSIUM CHLORIDE 20 MEQ
40 PACKET (EA) ORAL ONCE
Refills: 0 | Status: COMPLETED | OUTPATIENT
Start: 2020-08-29 | End: 2020-08-29

## 2020-08-29 RX ORDER — SODIUM,POTASSIUM PHOSPHATES 278-250MG
2 POWDER IN PACKET (EA) ORAL ONCE
Refills: 0 | Status: COMPLETED | OUTPATIENT
Start: 2020-08-29 | End: 2020-08-29

## 2020-08-29 RX ORDER — DOCUSATE SODIUM 100 MG
1 CAPSULE ORAL
Qty: 14 | Refills: 0
Start: 2020-08-29 | End: 2020-09-04

## 2020-08-29 RX ADMIN — Medication 2 PACKET(S): at 09:55

## 2020-08-29 RX ADMIN — Medication 650 MILLIGRAM(S): at 02:24

## 2020-08-29 RX ADMIN — Medication 650 MILLIGRAM(S): at 06:02

## 2020-08-29 RX ADMIN — Medication 650 MILLIGRAM(S): at 09:57

## 2020-08-29 RX ADMIN — Medication 40 MILLIEQUIVALENT(S): at 09:55

## 2020-08-29 RX ADMIN — Medication 650 MILLIGRAM(S): at 09:55

## 2020-08-29 RX ADMIN — Medication 650 MILLIGRAM(S): at 03:15

## 2020-08-29 RX ADMIN — Medication 650 MILLIGRAM(S): at 06:48

## 2020-08-29 NOTE — PROGRESS NOTE ADULT - SUBJECTIVE AND OBJECTIVE BOX
STATUS POST:  POD2 from ileostomy reversal     SUBJECTIVE: Patient seen and examined bedside by chief resident. FRANCOISE. Pt doing really well, tolerating LRD, no n/v. Passing flatus.    enoxaparin Injectable 40 milliGRAM(s) SubCutaneous at bedtime  tamsulosin 0.4 milliGRAM(s) Oral at bedtime      Vital Signs Last 24 Hrs  T(C): 36.6 (29 Aug 2020 07:57), Max: 37.3 (28 Aug 2020 16:35)  T(F): 97.8 (29 Aug 2020 07:57), Max: 99.2 (28 Aug 2020 16:35)  HR: 73 (29 Aug 2020 07:57) (73 - 91)  BP: 151/93 (29 Aug 2020 07:57) (125/71 - 151/93)  BP(mean): --  RR: 17 (29 Aug 2020 07:57) (17 - 18)  SpO2: 99% (29 Aug 2020 07:57) (95% - 99%)  I&O's Detail    28 Aug 2020 07:01  -  29 Aug 2020 07:00  --------------------------------------------------------  IN:    lactated ringers.: 480 mL    Oral Fluid: 1900 mL  Total IN: 2380 mL    OUT:    Voided: 2850 mL  Total OUT: 2850 mL    Total NET: -470 mL          Physical Exam:  General: No acute distress, resting comfortably in bed  Pulm: Nonlabored breathing, no respiratory distress, on RA  Abd: soft, non-tender, non-distended, incisions clean/dry/intact. dressing changed, no discharge or erythema.  Extrem: SCDs in place    LABS:                        13.8   7.13  )-----------( 207      ( 29 Aug 2020 07:00 )             40.8     08-29    143  |  110<H>  |  14  ----------------------------<  97  3.3<L>   |  24  |  1.07    Ca    8.8      29 Aug 2020 07:00  Phos  1.8     08-29  Mg     2.3     08-29            RADIOLOGY & ADDITIONAL STUDIES:

## 2020-08-29 NOTE — DISCHARGE NOTE NURSING/CASE MANAGEMENT/SOCIAL WORK - PATIENT PORTAL LINK FT
You can access the FollowMyHealth Patient Portal offered by [facility] by registering at the following website: [email or web address]. By joining "[employer]"’s FollowMyHealth portal, you will also be able to view your health information using other applications (apps) compatible with our system.

## 2020-08-29 NOTE — PROGRESS NOTE ADULT - ASSESSMENT
65M with a hx of a pT3N0 rectosigmoid cancer s/p neoadjuvant CRT and resection with diversion now here for elective surgery, pt is now s/p ileostomy reversal 8/27. Pt doing well, tolerating LRD, no n/v, can go home later today.    - Pain/nausea control prn  - CLD/IVF  - Lovenox/SCDs  - AM labs   - Home today

## 2020-08-31 DIAGNOSIS — G89.18 OTHER ACUTE POSTPROCEDURAL PAIN: ICD-10-CM

## 2020-09-02 DIAGNOSIS — Z43.2 ENCOUNTER FOR ATTENTION TO ILEOSTOMY: ICD-10-CM

## 2020-09-02 DIAGNOSIS — Z88.8 ALLERGY STATUS TO OTHER DRUGS, MEDICAMENTS AND BIOLOGICAL SUBSTANCES STATUS: ICD-10-CM

## 2020-09-02 DIAGNOSIS — Z85.048 PERSONAL HISTORY OF OTHER MALIGNANT NEOPLASM OF RECTUM, RECTOSIGMOID JUNCTION, AND ANUS: ICD-10-CM

## 2020-09-02 DIAGNOSIS — N40.0 BENIGN PROSTATIC HYPERPLASIA WITHOUT LOWER URINARY TRACT SYMPTOMS: ICD-10-CM

## 2020-09-02 DIAGNOSIS — Z85.46 PERSONAL HISTORY OF MALIGNANT NEOPLASM OF PROSTATE: ICD-10-CM

## 2020-09-02 LAB — SURGICAL PATHOLOGY STUDY: SIGNIFICANT CHANGE UP

## 2020-09-14 ENCOUNTER — APPOINTMENT (OUTPATIENT)
Dept: COLORECTAL SURGERY | Facility: CLINIC | Age: 65
End: 2020-09-14
Payer: MEDICARE

## 2020-09-14 VITALS
WEIGHT: 183 LBS | SYSTOLIC BLOOD PRESSURE: 137 MMHG | DIASTOLIC BLOOD PRESSURE: 80 MMHG | HEART RATE: 89 BPM | TEMPERATURE: 97.4 F | BODY MASS INDEX: 26.2 KG/M2 | HEIGHT: 70 IN

## 2020-09-14 PROCEDURE — 99024 POSTOP FOLLOW-UP VISIT: CPT

## 2020-09-14 NOTE — PHYSICAL EXAM
[Abdomen Masses] : No abdominal masses [Abdomen Tenderness] : ~T No ~M abdominal tenderness [Normal Breath Sounds] : Normal breath sounds [Normal Heart Sounds] : normal heart sounds [de-identified] : Incision is well-healed. Minimal area of granulation tissue superficial

## 2020-09-14 NOTE — HISTORY OF PRESENT ILLNESS
[FreeTextEntry1] : 64 yo M presents for f/u T3N0 sigmoid colon cancer s/p rectosigmoid colectomy w/ ileostomy 6/2/20\par s/p ileostomy reversal 8/27/20\par \par Surgical Final Report\par \par Final Diagnosis\par Ileostomy, resection:\par - Portion of small intestine and skin with anastomotic site showing acute and chronic inflammation and foreign body giant cell reaction, negative for carcinoma.\par \par Patient reports he is doing well. His appetite and energy is improving\par c/o slight abdominal swelling which is improved with applying ice pack. Also c/o pain in abdomen at night which is improved w/ Tylenol\par He has been changing gauze once daily\par Denies fever, n/v/c/d or BPR\par BH: 2-3 times daily, no complaints\par Reports adequate intake of fiber and water \par \par

## 2020-09-14 NOTE — ASSESSMENT
[FreeTextEntry1] : Recovered well from surgery.\par \par Advised increasing fiber intake daily.\par \par Continue oncological surveillance with medical oncology.\par \par Followup 6 months

## 2021-03-15 ENCOUNTER — APPOINTMENT (OUTPATIENT)
Dept: COLORECTAL SURGERY | Facility: CLINIC | Age: 66
End: 2021-03-15
Payer: MEDICARE

## 2021-03-15 VITALS
HEIGHT: 70 IN | WEIGHT: 198 LBS | HEART RATE: 85 BPM | DIASTOLIC BLOOD PRESSURE: 76 MMHG | TEMPERATURE: 98 F | SYSTOLIC BLOOD PRESSURE: 148 MMHG | BODY MASS INDEX: 28.35 KG/M2

## 2021-03-15 DIAGNOSIS — C18.7 MALIGNANT NEOPLASM OF SIGMOID COLON: ICD-10-CM

## 2021-03-15 PROCEDURE — 99214 OFFICE O/P EST MOD 30 MIN: CPT | Mod: 25

## 2021-03-15 PROCEDURE — 45300 PROCTOSIGMOIDOSCOPY DX: CPT

## 2021-03-15 RX ORDER — OXYCODONE AND ACETAMINOPHEN 5; 325 MG/1; MG/1
5-325 TABLET ORAL EVERY 6 HOURS
Qty: 16 | Refills: 0 | Status: DISCONTINUED | COMMUNITY
Start: 2020-08-31 | End: 2021-03-15

## 2021-03-15 RX ORDER — METRONIDAZOLE 500 MG/1
500 TABLET ORAL
Qty: 6 | Refills: 0 | Status: DISCONTINUED | COMMUNITY
Start: 2020-04-27 | End: 2021-03-15

## 2021-03-15 RX ORDER — NEOMYCIN SULFATE 500 MG/1
500 TABLET ORAL
Qty: 6 | Refills: 0 | Status: DISCONTINUED | COMMUNITY
Start: 2020-04-27 | End: 2021-03-15

## 2021-03-15 NOTE — HISTORY OF PRESENT ILLNESS
[FreeTextEntry1] : 65 yo M presents for f/u T3N0 sigmoid colon cancer \par Hx of CRT followed by rectosigmoid colectomy w/ ileostomy 6/2/20, s/p ileostomy reversal 8/27/20\par Reports appetite is good and energy level improved.\par Occasionally feelings "pulling" in abdomen when he climbs stairs\par Denies fever, abdominal pain or n/v, or BPR\par Followed by [facility] Benjamin Ho and Swati q 3 months, last seen by Dr. Ho 1 month ago\par \par BH: 1-2 times daily, denies complaints\par Eating green vegetables daily\par Last PET/CT 4/2020\par Last colonoscopy completed April 2020 w/ Dr. Bender. patient doesn't have surveillance colonoscopy scheduled\par Denies ASA/NSAIDs last 7 days

## 2021-03-15 NOTE — PHYSICAL EXAM
[Abdomen Masses] : No abdominal masses [Abdomen Tenderness] : ~T No ~M abdominal tenderness [Normal] : was normal [None] : there was no rectal mass  [Normal Breath Sounds] : Normal breath sounds [Normal Heart Sounds] : normal heart sounds [No Rash or Lesion] : No rash or lesion [Alert] : alert [Calm] : calm [de-identified] : Incision is well-healed.  [FreeTextEntry1] : A rigid proctosigmoidoscope was passed through he anus into the rectum to  10  cm. . The mucosal surface were inspected. The patient tolerated the procedure well.\par \par The findings revealed:\par residual stool. No masses or lesions.

## 2021-03-15 NOTE — ASSESSMENT
[FreeTextEntry1] : Remains clinically LACI.\par \par Advised to return to primary gastroenterologist for surveillance colonoscopy. Continue followup with medical oncology and routine interval imaging.

## 2021-09-17 ENCOUNTER — APPOINTMENT (OUTPATIENT)
Dept: COLORECTAL SURGERY | Facility: CLINIC | Age: 66
End: 2021-09-17

## 2021-10-06 PROBLEM — I10 ESSENTIAL HYPERTENSION: Status: ACTIVE | Noted: 2020-05-21

## 2023-03-24 NOTE — PROGRESS NOTE ADULT - ASSESSMENT
Orders for CBC in 2 weeks faxed to [facility] with last office note for their records.   65 year old male with PMH of prostate ca about 4 years ago s/p brachytherapy, BPH, rectosigmoid cancer s/p CRT presents for elective sigmoidectomy. Patient underwent colonoscopy 1/22/20 which found moderately differentiated adenocarcinoma fragments on biopsy. Pt is now s/p robot assisted sigmoid colectomy with diverting loop ileostomy 6/2 POD 1     Plan  - Advance to LRD   - Remove Mcmillan   - Pain/Nausea Control PRN  - Lovenox/SCDs  - OOB/IS  - AM Labs